# Patient Record
Sex: FEMALE | Race: WHITE | Employment: OTHER | URBAN - NONMETROPOLITAN AREA
[De-identification: names, ages, dates, MRNs, and addresses within clinical notes are randomized per-mention and may not be internally consistent; named-entity substitution may affect disease eponyms.]

---

## 2017-06-29 RX ORDER — FUROSEMIDE 20 MG/1
20 TABLET ORAL DAILY
Qty: 90 TABLET | Refills: 1 | Status: SHIPPED | OUTPATIENT
Start: 2017-06-29 | End: 2017-08-01 | Stop reason: SDUPTHER

## 2017-06-29 RX ORDER — AMLODIPINE BESYLATE 5 MG/1
5 TABLET ORAL 2 TIMES DAILY
Qty: 180 TABLET | Refills: 1 | Status: SHIPPED | OUTPATIENT
Start: 2017-06-29 | End: 2017-08-01 | Stop reason: SDUPTHER

## 2017-07-05 DIAGNOSIS — N18.9 CKD (CHRONIC KIDNEY DISEASE), UNSPECIFIED STAGE: ICD-10-CM

## 2017-07-05 DIAGNOSIS — I10 ESSENTIAL HYPERTENSION: Primary | ICD-10-CM

## 2017-07-11 ENCOUNTER — HOSPITAL ENCOUNTER (OUTPATIENT)
Dept: LAB | Age: 77
Discharge: HOME | End: 2017-07-11
Attending: INTERNAL MEDICINE | Admitting: INTERNAL MEDICINE

## 2017-07-11 LAB
ALBUMIN SERPL-MCNC: 4 GM/DL (ref 3.4–5)
ALP BLD-CCNC: 95 IU/L (ref 40–129)
ALT SERPL-CCNC: 19 U/L (ref 10–40)
ANION GAP SERPL CALCULATED.3IONS-SCNC: 12 MMOL/L (ref 4–16)
AST SERPL-CCNC: 23 IU/L (ref 15–37)
BASOPHILS ABSOLUTE: 0 K/CU MM
BASOPHILS RELATIVE PERCENT: 0.5 % (ref 0–1)
BILIRUB SERPL-MCNC: 0.4 MG/DL (ref 0–1)
BUN BLDV-MCNC: 28 MG/DL (ref 6–23)
CALCIUM SERPL-MCNC: 9.3 MG/DL (ref 8.3–10.6)
CHLORIDE BLD-SCNC: 106 MMOL/L (ref 99–110)
CO2: 24 MMOL/L (ref 21–32)
CREAT SERPL-MCNC: 1.4 MG/DL (ref 0.6–1.1)
DIFFERENTIAL TYPE: ABNORMAL
EOSINOPHILS ABSOLUTE: 0.1 K/CU MM
EOSINOPHILS RELATIVE PERCENT: 2 % (ref 0–3)
GFR AFRICAN AMERICAN: 44 ML/MIN/1.73M2
GFR NON-AFRICAN AMERICAN: 37 ML/MIN/1.73M2
GLUCOSE BLD-MCNC: 103 MG/DL (ref 70–140)
HCT VFR BLD CALC: 41 % (ref 37–47)
HEMOGLOBIN: 13.3 GM/DL (ref 12.5–16)
IMMATURE NEUTROPHIL %: 0.5 % (ref 0–0.43)
LYMPHOCYTES ABSOLUTE: 1 K/CU MM
LYMPHOCYTES RELATIVE PERCENT: 16.1 % (ref 24–44)
MCH RBC QN AUTO: 32.8 PG (ref 27–31)
MCHC RBC AUTO-ENTMCNC: 32.4 % (ref 32–36)
MCV RBC AUTO: 101 FL (ref 78–100)
MONOCYTES ABSOLUTE: 0.5 K/CU MM
MONOCYTES RELATIVE PERCENT: 7.9 % (ref 0–4)
PDW BLD-RTO: 13.6 % (ref 11.7–14.9)
PLATELET # BLD: 187 K/CU MM (ref 140–440)
PMV BLD AUTO: 9.8 FL (ref 7.5–11.1)
POTASSIUM SERPL-SCNC: 4.3 MMOL/L (ref 3.5–5.1)
RBC # BLD: 4.06 M/CU MM (ref 4.2–5.4)
SEGMENTED NEUTROPHILS ABSOLUTE COUNT: 4.5 K/CU MM
SEGMENTED NEUTROPHILS RELATIVE PERCENT: 73 % (ref 36–66)
SODIUM BLD-SCNC: 142 MMOL/L (ref 135–145)
TOTAL IMMATURE NEUTOROPHIL: 0.03 K/CU MM
TOTAL PROTEIN: 7.4 GM/DL (ref 6.4–8.2)
WBC # BLD: 6.1 K/CU MM (ref 4–10.5)

## 2017-08-01 ENCOUNTER — OFFICE VISIT (OUTPATIENT)
Dept: INTERNAL MEDICINE CLINIC | Age: 77
End: 2017-08-01

## 2017-08-01 VITALS
WEIGHT: 274 LBS | HEART RATE: 76 BPM | RESPIRATION RATE: 12 BRPM | DIASTOLIC BLOOD PRESSURE: 74 MMHG | TEMPERATURE: 98.3 F | HEIGHT: 64 IN | BODY MASS INDEX: 46.78 KG/M2 | SYSTOLIC BLOOD PRESSURE: 136 MMHG

## 2017-08-01 DIAGNOSIS — N18.9 CKD (CHRONIC KIDNEY DISEASE), UNSPECIFIED STAGE: ICD-10-CM

## 2017-08-01 DIAGNOSIS — I10 ESSENTIAL HYPERTENSION: Primary | ICD-10-CM

## 2017-08-01 DIAGNOSIS — H40.9 GLAUCOMA OF BOTH EYES, UNSPECIFIED GLAUCOMA: ICD-10-CM

## 2017-08-01 DIAGNOSIS — N20.0 KIDNEY STONES: ICD-10-CM

## 2017-08-01 DIAGNOSIS — F51.01 PRIMARY INSOMNIA: ICD-10-CM

## 2017-08-01 DIAGNOSIS — N39.41 URGE INCONTINENCE OF URINE: ICD-10-CM

## 2017-08-01 DIAGNOSIS — R60.0 PEDAL EDEMA: ICD-10-CM

## 2017-08-01 DIAGNOSIS — G57.91 NEUROPATHY OF RIGHT LOWER EXTREMITY: ICD-10-CM

## 2017-08-01 PROCEDURE — 0509F URINE INCON PLAN DOCD: CPT | Performed by: INTERNAL MEDICINE

## 2017-08-01 PROCEDURE — 1123F ACP DISCUSS/DSCN MKR DOCD: CPT | Performed by: INTERNAL MEDICINE

## 2017-08-01 PROCEDURE — G8427 DOCREV CUR MEDS BY ELIG CLIN: HCPCS | Performed by: INTERNAL MEDICINE

## 2017-08-01 PROCEDURE — G8417 CALC BMI ABV UP PARAM F/U: HCPCS | Performed by: INTERNAL MEDICINE

## 2017-08-01 PROCEDURE — 1036F TOBACCO NON-USER: CPT | Performed by: INTERNAL MEDICINE

## 2017-08-01 PROCEDURE — 4040F PNEUMOC VAC/ADMIN/RCVD: CPT | Performed by: INTERNAL MEDICINE

## 2017-08-01 PROCEDURE — 3017F COLORECTAL CA SCREEN DOC REV: CPT | Performed by: INTERNAL MEDICINE

## 2017-08-01 PROCEDURE — 99214 OFFICE O/P EST MOD 30 MIN: CPT | Performed by: INTERNAL MEDICINE

## 2017-08-01 PROCEDURE — 1090F PRES/ABSN URINE INCON ASSESS: CPT | Performed by: INTERNAL MEDICINE

## 2017-08-01 PROCEDURE — G8399 PT W/DXA RESULTS DOCUMENT: HCPCS | Performed by: INTERNAL MEDICINE

## 2017-08-01 RX ORDER — AMLODIPINE BESYLATE 5 MG/1
5 TABLET ORAL DAILY
Qty: 90 TABLET | Refills: 1 | Status: SHIPPED | OUTPATIENT
Start: 2017-08-01 | End: 2017-09-14 | Stop reason: SDUPTHER

## 2017-08-01 RX ORDER — QUETIAPINE FUMARATE 50 MG/1
50 TABLET, FILM COATED ORAL NIGHTLY
Qty: 90 TABLET | Refills: 1 | Status: SHIPPED | OUTPATIENT
Start: 2017-08-01 | End: 2017-11-28 | Stop reason: SDUPTHER

## 2017-08-01 RX ORDER — POTASSIUM CITRATE 10 MEQ/1
10 TABLET, EXTENDED RELEASE ORAL 2 TIMES DAILY
Qty: 180 TABLET | Refills: 1 | Status: SHIPPED | OUTPATIENT
Start: 2017-08-01 | End: 2017-09-14 | Stop reason: SDUPTHER

## 2017-08-01 RX ORDER — GABAPENTIN 300 MG/1
300 CAPSULE ORAL 3 TIMES DAILY
Qty: 270 CAPSULE | Refills: 1 | Status: SHIPPED | OUTPATIENT
Start: 2017-08-01 | End: 2017-09-14 | Stop reason: SDUPTHER

## 2017-08-01 RX ORDER — FUROSEMIDE 20 MG/1
20 TABLET ORAL DAILY
Qty: 90 TABLET | Refills: 1 | Status: SHIPPED | OUTPATIENT
Start: 2017-08-01 | End: 2017-09-14 | Stop reason: SDUPTHER

## 2017-08-01 RX ORDER — CARVEDILOL 25 MG/1
25 TABLET ORAL 2 TIMES DAILY WITH MEALS
Qty: 180 TABLET | Refills: 1 | Status: SHIPPED | OUTPATIENT
Start: 2017-08-01 | End: 2017-09-14 | Stop reason: SDUPTHER

## 2017-08-01 RX ORDER — QUETIAPINE FUMARATE 100 MG/1
50 TABLET, FILM COATED ORAL NIGHTLY
Qty: 45 TABLET | Refills: 1 | Status: CANCELLED | OUTPATIENT
Start: 2017-08-01

## 2017-08-01 ASSESSMENT — ENCOUNTER SYMPTOMS
GASTROINTESTINAL NEGATIVE: 1
EYES NEGATIVE: 1
RESPIRATORY NEGATIVE: 1

## 2017-08-22 ENCOUNTER — HOSPITAL ENCOUNTER (OUTPATIENT)
Dept: PHYSICAL THERAPY | Age: 77
Discharge: HOME | End: 2017-08-31
Attending: INTERNAL MEDICINE | Admitting: INTERNAL MEDICINE

## 2017-09-01 ENCOUNTER — HOSPITAL ENCOUNTER (OUTPATIENT)
Dept: PHYSICAL THERAPY | Age: 77
Discharge: HOME | End: 2017-09-30
Attending: INTERNAL MEDICINE | Admitting: INTERNAL MEDICINE

## 2017-09-14 ENCOUNTER — OFFICE VISIT (OUTPATIENT)
Dept: INTERNAL MEDICINE CLINIC | Age: 77
End: 2017-09-14

## 2017-09-14 VITALS
DIASTOLIC BLOOD PRESSURE: 86 MMHG | RESPIRATION RATE: 16 BRPM | BODY MASS INDEX: 46.28 KG/M2 | HEART RATE: 84 BPM | SYSTOLIC BLOOD PRESSURE: 134 MMHG | OXYGEN SATURATION: 96 % | WEIGHT: 269.6 LBS | TEMPERATURE: 97.6 F

## 2017-09-14 DIAGNOSIS — E66.01 MORBID OBESITY WITH BMI OF 45.0-49.9, ADULT (HCC): ICD-10-CM

## 2017-09-14 DIAGNOSIS — H04.123 DRY EYE SYNDROME, BILATERAL: ICD-10-CM

## 2017-09-14 DIAGNOSIS — R60.0 PEDAL EDEMA: ICD-10-CM

## 2017-09-14 DIAGNOSIS — I10 ESSENTIAL HYPERTENSION: Primary | ICD-10-CM

## 2017-09-14 DIAGNOSIS — N20.0 KIDNEY STONES: ICD-10-CM

## 2017-09-14 DIAGNOSIS — F51.01 PRIMARY INSOMNIA: ICD-10-CM

## 2017-09-14 DIAGNOSIS — N18.3 CKD (CHRONIC KIDNEY DISEASE), STAGE 3 (MODERATE): ICD-10-CM

## 2017-09-14 DIAGNOSIS — G57.91 NEUROPATHY OF RIGHT LOWER EXTREMITY: ICD-10-CM

## 2017-09-14 DIAGNOSIS — Z23 NEED FOR IMMUNIZATION AGAINST INFLUENZA: ICD-10-CM

## 2017-09-14 PROCEDURE — 3017F COLORECTAL CA SCREEN DOC REV: CPT | Performed by: INTERNAL MEDICINE

## 2017-09-14 PROCEDURE — 1090F PRES/ABSN URINE INCON ASSESS: CPT | Performed by: INTERNAL MEDICINE

## 2017-09-14 PROCEDURE — G8427 DOCREV CUR MEDS BY ELIG CLIN: HCPCS | Performed by: INTERNAL MEDICINE

## 2017-09-14 PROCEDURE — G0008 ADMIN INFLUENZA VIRUS VAC: HCPCS | Performed by: INTERNAL MEDICINE

## 2017-09-14 PROCEDURE — G8417 CALC BMI ABV UP PARAM F/U: HCPCS | Performed by: INTERNAL MEDICINE

## 2017-09-14 PROCEDURE — 90662 IIV NO PRSV INCREASED AG IM: CPT | Performed by: INTERNAL MEDICINE

## 2017-09-14 PROCEDURE — 1036F TOBACCO NON-USER: CPT | Performed by: INTERNAL MEDICINE

## 2017-09-14 PROCEDURE — 4040F PNEUMOC VAC/ADMIN/RCVD: CPT | Performed by: INTERNAL MEDICINE

## 2017-09-14 PROCEDURE — G8399 PT W/DXA RESULTS DOCUMENT: HCPCS | Performed by: INTERNAL MEDICINE

## 2017-09-14 PROCEDURE — 99213 OFFICE O/P EST LOW 20 MIN: CPT | Performed by: INTERNAL MEDICINE

## 2017-09-14 PROCEDURE — 1123F ACP DISCUSS/DSCN MKR DOCD: CPT | Performed by: INTERNAL MEDICINE

## 2017-09-14 RX ORDER — FUROSEMIDE 20 MG/1
20 TABLET ORAL DAILY
Qty: 90 TABLET | Refills: 1 | Status: SHIPPED | OUTPATIENT
Start: 2017-09-14 | End: 2017-11-28 | Stop reason: SDUPTHER

## 2017-09-14 RX ORDER — POTASSIUM CITRATE 10 MEQ/1
10 TABLET, EXTENDED RELEASE ORAL 2 TIMES DAILY
Qty: 180 TABLET | Refills: 1 | Status: SHIPPED | OUTPATIENT
Start: 2017-09-14 | End: 2017-11-28 | Stop reason: SDUPTHER

## 2017-09-14 RX ORDER — QUETIAPINE FUMARATE 50 MG/1
TABLET, FILM COATED ORAL
Qty: 90 TABLET | Refills: 1 | Status: SHIPPED | OUTPATIENT
Start: 2017-09-14 | End: 2017-11-28 | Stop reason: DRUGHIGH

## 2017-09-14 RX ORDER — CARVEDILOL 25 MG/1
25 TABLET ORAL 2 TIMES DAILY WITH MEALS
Qty: 180 TABLET | Refills: 1 | Status: SHIPPED | OUTPATIENT
Start: 2017-09-14 | End: 2017-11-28 | Stop reason: SDUPTHER

## 2017-09-14 RX ORDER — AMLODIPINE BESYLATE 5 MG/1
5 TABLET ORAL DAILY
Qty: 90 TABLET | Refills: 1 | Status: SHIPPED | OUTPATIENT
Start: 2017-09-14 | End: 2017-11-28 | Stop reason: SDUPTHER

## 2017-09-14 RX ORDER — GABAPENTIN 300 MG/1
300 CAPSULE ORAL 3 TIMES DAILY
Qty: 270 CAPSULE | Refills: 1 | Status: SHIPPED | OUTPATIENT
Start: 2017-09-14 | End: 2017-11-28 | Stop reason: SDUPTHER

## 2017-09-14 ASSESSMENT — PATIENT HEALTH QUESTIONNAIRE - PHQ9
2. FEELING DOWN, DEPRESSED OR HOPELESS: 0
SUM OF ALL RESPONSES TO PHQ QUESTIONS 1-9: 0
SUM OF ALL RESPONSES TO PHQ9 QUESTIONS 1 & 2: 0
1. LITTLE INTEREST OR PLEASURE IN DOING THINGS: 0

## 2017-09-17 PROBLEM — E66.01 MORBID OBESITY WITH BMI OF 45.0-49.9, ADULT (HCC): Status: ACTIVE | Noted: 2017-09-17

## 2017-09-17 ASSESSMENT — ENCOUNTER SYMPTOMS
VOMITING: 0
HEARTBURN: 0
GASTROINTESTINAL NEGATIVE: 1
EYES NEGATIVE: 1
NAUSEA: 0

## 2017-09-21 ENCOUNTER — HOSPITAL ENCOUNTER (OUTPATIENT)
Dept: PHYSICAL THERAPY | Age: 77
Discharge: HOME | End: 2017-09-21
Admitting: INTERNAL MEDICINE

## 2017-10-01 ENCOUNTER — HOSPITAL ENCOUNTER (OUTPATIENT)
Dept: PHYSICAL THERAPY | Age: 77
Discharge: HOME | End: 2017-10-31
Attending: INTERNAL MEDICINE | Admitting: INTERNAL MEDICINE

## 2017-11-21 ENCOUNTER — HOSPITAL ENCOUNTER (OUTPATIENT)
Dept: LAB | Age: 77
Discharge: HOME | End: 2017-11-21
Attending: INTERNAL MEDICINE | Admitting: INTERNAL MEDICINE

## 2017-11-21 LAB
ALBUMIN SERPL-MCNC: 4.2 GM/DL (ref 3.4–5)
ALP BLD-CCNC: 88 IU/L (ref 40–129)
ALT SERPL-CCNC: 16 U/L (ref 10–40)
ANION GAP SERPL CALCULATED.3IONS-SCNC: 18 MMOL/L (ref 4–16)
AST SERPL-CCNC: 24 IU/L (ref 15–37)
BILIRUB SERPL-MCNC: 0.4 MG/DL (ref 0–1)
BUN BLDV-MCNC: 25 MG/DL (ref 6–23)
CALCIUM SERPL-MCNC: 9.6 MG/DL (ref 8.3–10.6)
CHLORIDE BLD-SCNC: 105 MMOL/L (ref 99–110)
CO2: 22 MMOL/L (ref 21–32)
CREAT SERPL-MCNC: 1.3 MG/DL (ref 0.6–1.1)
GFR AFRICAN AMERICAN: 48 ML/MIN/1.73M2
GFR NON-AFRICAN AMERICAN: 40 ML/MIN/1.73M2
GLUCOSE BLD-MCNC: 93 MG/DL (ref 70–99)
POTASSIUM SERPL-SCNC: 4.6 MMOL/L (ref 3.5–5.1)
SODIUM BLD-SCNC: 145 MMOL/L (ref 135–145)
TOTAL PROTEIN: 7.5 GM/DL (ref 6.4–8.2)

## 2017-11-28 ENCOUNTER — OFFICE VISIT (OUTPATIENT)
Dept: INTERNAL MEDICINE CLINIC | Age: 77
End: 2017-11-28

## 2017-11-28 VITALS
DIASTOLIC BLOOD PRESSURE: 74 MMHG | OXYGEN SATURATION: 95 % | HEART RATE: 64 BPM | BODY MASS INDEX: 46.23 KG/M2 | WEIGHT: 270.8 LBS | SYSTOLIC BLOOD PRESSURE: 132 MMHG | HEIGHT: 64 IN | RESPIRATION RATE: 12 BRPM | TEMPERATURE: 97.9 F

## 2017-11-28 DIAGNOSIS — H04.123 INSUFFICIENCY OF TEAR FILM OF BOTH EYES: ICD-10-CM

## 2017-11-28 DIAGNOSIS — F51.01 PRIMARY INSOMNIA: ICD-10-CM

## 2017-11-28 DIAGNOSIS — N39.41 URGE INCONTINENCE OF URINE: ICD-10-CM

## 2017-11-28 DIAGNOSIS — N20.0 KIDNEY STONES: ICD-10-CM

## 2017-11-28 DIAGNOSIS — N18.30 STAGE 3 CHRONIC KIDNEY DISEASE (HCC): ICD-10-CM

## 2017-11-28 DIAGNOSIS — H40.9 GLAUCOMA OF BOTH EYES, UNSPECIFIED GLAUCOMA TYPE: ICD-10-CM

## 2017-11-28 DIAGNOSIS — E66.01 MORBID OBESITY WITH BMI OF 45.0-49.9, ADULT (HCC): ICD-10-CM

## 2017-11-28 DIAGNOSIS — I10 ESSENTIAL HYPERTENSION: ICD-10-CM

## 2017-11-28 DIAGNOSIS — Z12.39 BREAST CANCER SCREENING: Primary | ICD-10-CM

## 2017-11-28 DIAGNOSIS — G57.91 NEUROPATHY OF RIGHT LOWER EXTREMITY: ICD-10-CM

## 2017-11-28 DIAGNOSIS — R60.0 PEDAL EDEMA: ICD-10-CM

## 2017-11-28 PROCEDURE — 99214 OFFICE O/P EST MOD 30 MIN: CPT | Performed by: INTERNAL MEDICINE

## 2017-11-28 PROCEDURE — G8484 FLU IMMUNIZE NO ADMIN: HCPCS | Performed by: INTERNAL MEDICINE

## 2017-11-28 PROCEDURE — 1123F ACP DISCUSS/DSCN MKR DOCD: CPT | Performed by: INTERNAL MEDICINE

## 2017-11-28 PROCEDURE — 1036F TOBACCO NON-USER: CPT | Performed by: INTERNAL MEDICINE

## 2017-11-28 PROCEDURE — G8399 PT W/DXA RESULTS DOCUMENT: HCPCS | Performed by: INTERNAL MEDICINE

## 2017-11-28 PROCEDURE — 0509F URINE INCON PLAN DOCD: CPT | Performed by: INTERNAL MEDICINE

## 2017-11-28 PROCEDURE — G8417 CALC BMI ABV UP PARAM F/U: HCPCS | Performed by: INTERNAL MEDICINE

## 2017-11-28 PROCEDURE — G8427 DOCREV CUR MEDS BY ELIG CLIN: HCPCS | Performed by: INTERNAL MEDICINE

## 2017-11-28 PROCEDURE — 1090F PRES/ABSN URINE INCON ASSESS: CPT | Performed by: INTERNAL MEDICINE

## 2017-11-28 PROCEDURE — 4040F PNEUMOC VAC/ADMIN/RCVD: CPT | Performed by: INTERNAL MEDICINE

## 2017-11-28 RX ORDER — AMLODIPINE BESYLATE 5 MG/1
5 TABLET ORAL DAILY
Qty: 90 TABLET | Refills: 1 | Status: SHIPPED | OUTPATIENT
Start: 2017-11-28 | End: 2021-04-21 | Stop reason: ALTCHOICE

## 2017-11-28 RX ORDER — POTASSIUM CITRATE 10 MEQ/1
10 TABLET, EXTENDED RELEASE ORAL 2 TIMES DAILY
Qty: 180 TABLET | Refills: 1 | Status: SHIPPED | OUTPATIENT
Start: 2017-11-28 | End: 2020-10-12 | Stop reason: ALTCHOICE

## 2017-11-28 RX ORDER — GABAPENTIN 300 MG/1
300 CAPSULE ORAL 3 TIMES DAILY
Qty: 270 CAPSULE | Refills: 1 | Status: SHIPPED | OUTPATIENT
Start: 2017-11-28

## 2017-11-28 RX ORDER — CARVEDILOL 25 MG/1
25 TABLET ORAL 2 TIMES DAILY WITH MEALS
Qty: 180 TABLET | Refills: 1 | Status: ON HOLD | OUTPATIENT
Start: 2017-11-28 | End: 2021-03-23 | Stop reason: HOSPADM

## 2017-11-28 RX ORDER — ACYCLOVIR 400 MG/1
400 TABLET ORAL 2 TIMES DAILY
Qty: 180 TABLET | Refills: 1 | Status: CANCELLED | OUTPATIENT
Start: 2017-11-28

## 2017-11-28 RX ORDER — QUETIAPINE FUMARATE 50 MG/1
25 TABLET, FILM COATED ORAL NIGHTLY
Qty: 90 TABLET | Refills: 1 | Status: SHIPPED | OUTPATIENT
Start: 2017-11-28

## 2017-11-28 RX ORDER — FUROSEMIDE 20 MG/1
20 TABLET ORAL DAILY
Qty: 90 TABLET | Refills: 1 | Status: ON HOLD | OUTPATIENT
Start: 2017-11-28 | End: 2021-03-23 | Stop reason: HOSPADM

## 2017-11-28 ASSESSMENT — ENCOUNTER SYMPTOMS
RESPIRATORY NEGATIVE: 1
GASTROINTESTINAL NEGATIVE: 1
EYES NEGATIVE: 1

## 2017-11-28 NOTE — ASSESSMENT & PLAN NOTE
Has chronic pedal edema takes Lasix for that  Could be lymphedema. Keep leg elevated and use compression stockings.

## 2017-11-28 NOTE — PROGRESS NOTES
Daniel Johnson  Patient's  is 1940  Seen in office on 2017      SUBJECTIVE:  Roosevelt Munoz is a 69 y. o.year old female presents today   Chief Complaint   Patient presents with    Hypertension    Medication Refill    Discuss Labs     17     Pt is here for f/u of HTN neuropathy and CKD  She is feeling well. No complaints  No chest pain. No SOB  No headache. No NVD>  Lab results reviewed with you. Patient has hypertension. Taking medications No headaches, no chest pain, no palpitations and no dizziness. Has pain in the right knee. She had knee replacement   She has dry eye syndrome and takes med  She is using wheeled walker. Denies any falls. Taking medications regularly. No side effects noted. Review of Systems   Constitutional: Negative. HENT: Negative. Eyes: Negative. Respiratory: Negative. Cardiovascular: Negative. Negative for chest pain, palpitations, claudication and leg swelling. Gastrointestinal: Negative. Genitourinary: Negative. Musculoskeletal: Positive for joint pain. Negative for falls. Skin: Negative. Neurological: Negative. Endo/Heme/Allergies: Negative. Psychiatric/Behavioral: Negative. OBJECTIVE: /74 (Site: Left Arm, Position: Sitting)   Pulse 64   Temp 97.9 °F (36.6 °C)   Resp 12   Ht 5' 4\" (1.626 m)   Wt 270 lb 12.8 oz (122.8 kg)   SpO2 95%   BMI 46.48 kg/m²     Wt Readings from Last 3 Encounters:   17 270 lb 12.8 oz (122.8 kg)   17 269 lb 9.6 oz (122.3 kg)   17 274 lb (124.3 kg)      GENERAL:  Alert, oriented, pleasant, in no apparent distress. Obese. HEENT:  Conjunctiva pink, no scleral icterus. ENT clear. NECK:  Supple. No jugular venous distention noted. No masses felt,  CARDIOVASCULAR:  Normal S1 and S2  MARIBELL 2/6  PULMONARY:  No respiratory distress. No wheezes or rales. ABDOMEN:  Soft and non-tender,no masses  or organomegaly.   EXTREMITIES:  No cyanosis, clubbing, or significant 07/11/2017    HCT 41.0 07/11/2017     07/11/2017     Lipids:   Lab Results   Component Value Date    CHOL 194 11/10/2015    TRIG 96 11/10/2015    HDL 60 11/10/2015    LDLCALC 122 (H) 04/26/2012    LDLDIRECT 113 (H) 11/10/2015     Renal:   Lab Results   Component Value Date    BUN 25 11/21/2017    CREATININE 1.3 11/21/2017     11/21/2017    K 4.6 11/21/2017    ALT 16 11/21/2017    AST 24 11/21/2017    GLUCOSE 93 11/21/2017     PT/INR:   Lab Results   Component Value Date    INR 0.94 11/15/2013     A1C: No results found for: Ania Monroy MD, 11/28/2017 , 3:14 PM

## 2017-11-28 NOTE — ASSESSMENT & PLAN NOTE
Seen urologist in [place] Dr. Shasta Youngblood. 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 She  had ? urostomy and then had stent and  had lithotripsy.  Sees him every year  Pt is on Urocit-K

## 2017-11-28 NOTE — ASSESSMENT & PLAN NOTE
Has pain in the right leg and tingling since right hip surgery several years ago.   Patient is on gabapentin 300 mg 3 times a day

## 2018-06-04 ENCOUNTER — TELEPHONE (OUTPATIENT)
Dept: INTERNAL MEDICINE CLINIC | Age: 78
End: 2018-06-04

## 2018-06-26 ENCOUNTER — HOSPITAL ENCOUNTER (OUTPATIENT)
Dept: ULTRASOUND IMAGING | Age: 78
Discharge: HOME | End: 2018-06-26

## 2018-06-26 DIAGNOSIS — N20.0 CALCULUS, KIDNEY: ICD-10-CM

## 2018-06-26 DIAGNOSIS — I10 HYPERTENSION, UNSPECIFIED TYPE: ICD-10-CM

## 2018-06-26 DIAGNOSIS — N18.4 CHRONIC KIDNEY DISEASE, STAGE IV (SEVERE) (HCC): ICD-10-CM

## 2018-06-26 LAB
ALBUMIN SERPL-MCNC: 4.4 GM/DL (ref 3.4–5)
ANION GAP SERPL CALCULATED.3IONS-SCNC: 17 MMOL/L (ref 4–16)
BACTERIA: ABNORMAL /HPF
BILIRUBIN URINE: NEGATIVE MG/DL
BLOOD, URINE: NEGATIVE
BUN BLDV-MCNC: 24 MG/DL (ref 6–23)
CALCIUM SERPL-MCNC: 9.2 MG/DL (ref 8.3–10.6)
CHLORIDE BLD-SCNC: 103 MMOL/L (ref 99–110)
CLARITY: ABNORMAL
CO2: 22 MMOL/L (ref 21–32)
COLOR: YELLOW
CREAT SERPL-MCNC: 1.5 MG/DL (ref 0.6–1.1)
CREATININE URINE: 81.1 MG/DL (ref 28–217)
GFR AFRICAN AMERICAN: 41 ML/MIN/1.73M2
GFR NON-AFRICAN AMERICAN: 34 ML/MIN/1.73M2
GLUCOSE BLD-MCNC: 81 MG/DL (ref 70–99)
GLUCOSE, URINE: NEGATIVE MG/DL
KETONES, URINE: NEGATIVE MG/DL
LEUKOCYTE ESTERASE, URINE: ABNORMAL
MUCUS: ABNORMAL HPF
NITRITE URINE, QUANTITATIVE: NEGATIVE
PH, URINE: 5 (ref 5–8)
PHOSPHORUS: 3.8 MG/DL (ref 2.5–4.9)
POTASSIUM SERPL-SCNC: 4.7 MMOL/L (ref 3.5–5.1)
PROT/CREAT RATIO, UR: 0.1
PROTEIN UA: NEGATIVE MG/DL
RBC URINE: 2 /HPF (ref 0–6)
SODIUM BLD-SCNC: 142 MMOL/L (ref 135–145)
SPECIFIC GRAVITY UA: 1.01 (ref 1–1.03)
SQUAMOUS EPITHELIAL: <1 /HPF
TRICHOMONAS: ABNORMAL /HPF
URINE TOTAL PROTEIN: 9.7 MG/DL
UROBILINOGEN, URINE: NORMAL MG/DL (ref 0.2–1)
VITAMIN D 25-HYDROXY: 47.61 NG/ML
WBC UA: 1 /HPF (ref 0–5)

## 2018-06-28 LAB — PARATHYROID HORMONE INTACT: 91

## 2018-07-18 ENCOUNTER — HOSPITAL ENCOUNTER (OUTPATIENT)
Dept: LAB | Age: 78
Discharge: HOME | End: 2018-07-18
Attending: INTERNAL MEDICINE | Admitting: INTERNAL MEDICINE

## 2018-07-18 LAB
ALBUMIN SERPL-MCNC: 4.1 GM/DL (ref 3.4–5)
ANION GAP SERPL CALCULATED.3IONS-SCNC: 15 MMOL/L (ref 4–16)
BUN BLDV-MCNC: 19 MG/DL (ref 6–23)
CALCIUM SERPL-MCNC: 9.5 MG/DL (ref 8.3–10.6)
CHLORIDE BLD-SCNC: 106 MMOL/L (ref 99–110)
CO2: 25 MMOL/L (ref 21–32)
CREAT SERPL-MCNC: 1.3 MG/DL (ref 0.6–1.1)
GFR AFRICAN AMERICAN: 48 ML/MIN/1.73M2
GFR NON-AFRICAN AMERICAN: 40 ML/MIN/1.73M2
GLUCOSE BLD-MCNC: 89 MG/DL (ref 70–99)
PHOSPHORUS: 3.7 MG/DL (ref 2.5–4.9)
POTASSIUM SERPL-SCNC: 4.2 MMOL/L (ref 3.5–5.1)
SODIUM BLD-SCNC: 146 MMOL/L (ref 135–145)

## 2018-10-09 ENCOUNTER — HOSPITAL ENCOUNTER (OUTPATIENT)
Age: 78
Discharge: HOME | End: 2018-10-09
Payer: MEDICARE

## 2018-10-09 LAB
ALBUMIN SERPL-MCNC: 3.8 GM/DL (ref 3.4–5)
ANION GAP SERPL CALCULATED.3IONS-SCNC: 12 MMOL/L (ref 4–16)
BACTERIA: ABNORMAL /HPF
BILIRUBIN URINE: NEGATIVE MG/DL
BLOOD, URINE: NEGATIVE
BUN BLDV-MCNC: 19 MG/DL (ref 6–23)
CALCIUM SERPL-MCNC: 9 MG/DL (ref 8.3–10.6)
CAST TYPE: ABNORMAL /HPF
CHLORIDE BLD-SCNC: 107 MMOL/L (ref 99–110)
CLARITY: CLEAR
CO2: 28 MMOL/L (ref 21–32)
COLOR: YELLOW
CREAT SERPL-MCNC: 1.3 MG/DL (ref 0.6–1.1)
CREATININE URINE: 85.8 MG/DL (ref 28–217)
CRYSTAL TYPE: ABNORMAL /HPF
EPITHELIAL CELLS, UA: ABNORMAL /HPF
GFR AFRICAN AMERICAN: 48 ML/MIN/1.73M2
GFR NON-AFRICAN AMERICAN: 40 ML/MIN/1.73M2
GLUCOSE BLD-MCNC: 98 MG/DL (ref 70–99)
GLUCOSE, URINE: NEGATIVE MG/DL
KETONES, URINE: NEGATIVE MG/DL
LEUKOCYTE ESTERASE, URINE: NEGATIVE
MUCUS: NEGATIVE HPF
NITRITE URINE, QUANTITATIVE: NEGATIVE
PH, URINE: 5 (ref 5–8)
PHOSPHORUS: 3.9 MG/DL (ref 2.5–4.9)
POTASSIUM SERPL-SCNC: 4.3 MMOL/L (ref 3.5–5.1)
PROT/CREAT RATIO, UR: 0.1
PROTEIN UA: NEGATIVE MG/DL
RBC URINE: ABNORMAL /HPF (ref 0–6)
SODIUM BLD-SCNC: 147 MMOL/L (ref 135–145)
SPECIFIC GRAVITY UA: 1.01 (ref 1–1.03)
URINE TOTAL PROTEIN: 6.9 MG/DL
UROBILINOGEN, URINE: 1 MG/DL (ref 0.2–1)
VITAMIN D 25-HYDROXY: 42.13 NG/ML
VOLUME, (UVOL): 12 ML (ref 10–12)
WBC UA: ABNORMAL /HPF (ref 0–5)

## 2018-10-09 PROCEDURE — 84156 ASSAY OF PROTEIN URINE: CPT

## 2018-10-09 PROCEDURE — 82306 VITAMIN D 25 HYDROXY: CPT

## 2018-10-09 PROCEDURE — 80069 RENAL FUNCTION PANEL: CPT

## 2018-10-09 PROCEDURE — 83970 ASSAY OF PARATHORMONE: CPT

## 2018-10-09 PROCEDURE — 81001 URINALYSIS AUTO W/SCOPE: CPT

## 2018-10-09 PROCEDURE — 82570 ASSAY OF URINE CREATININE: CPT

## 2018-10-09 PROCEDURE — 36415 COLL VENOUS BLD VENIPUNCTURE: CPT

## 2018-10-12 LAB — PARATHYROID HORMONE INTACT: 119

## 2019-05-31 ENCOUNTER — HOSPITAL ENCOUNTER (OUTPATIENT)
Age: 79
Discharge: HOME | End: 2019-05-31
Payer: MEDICARE

## 2019-05-31 LAB
ALBUMIN SERPL-MCNC: 4 GM/DL (ref 3.4–5)
ANION GAP SERPL CALCULATED.3IONS-SCNC: 8 MMOL/L (ref 4–16)
BASOPHILS ABSOLUTE: 0 K/CU MM
BASOPHILS RELATIVE PERCENT: 0.7 % (ref 0–1)
BUN BLDV-MCNC: 17 MG/DL (ref 6–23)
CALCIUM SERPL-MCNC: 10.1 MG/DL (ref 8.3–10.6)
CHLORIDE BLD-SCNC: 104 MMOL/L (ref 99–110)
CO2: 30 MMOL/L (ref 21–32)
CREAT SERPL-MCNC: 1.2 MG/DL (ref 0.6–1.1)
DIFFERENTIAL TYPE: ABNORMAL
EOSINOPHILS ABSOLUTE: 0.2 K/CU MM
EOSINOPHILS RELATIVE PERCENT: 3.4 % (ref 0–3)
GFR AFRICAN AMERICAN: 53 ML/MIN/1.73M2
GFR NON-AFRICAN AMERICAN: 44 ML/MIN/1.73M2
GLUCOSE BLD-MCNC: 102 MG/DL (ref 70–99)
HCT VFR BLD CALC: 46.1 % (ref 37–47)
HEMOGLOBIN: 14.8 GM/DL (ref 12.5–16)
IMMATURE NEUTROPHIL %: 0.5 % (ref 0–0.43)
LYMPHOCYTES ABSOLUTE: 1 K/CU MM
LYMPHOCYTES RELATIVE PERCENT: 18.9 % (ref 24–44)
MCH RBC QN AUTO: 32.4 PG (ref 27–31)
MCHC RBC AUTO-ENTMCNC: 32.1 % (ref 32–36)
MCV RBC AUTO: 100.9 FL (ref 78–100)
MONOCYTES ABSOLUTE: 0.5 K/CU MM
MONOCYTES RELATIVE PERCENT: 9.4 % (ref 0–4)
PDW BLD-RTO: 13.3 % (ref 11.7–14.9)
PHOSPHORUS: 3.2 MG/DL (ref 2.5–4.9)
PLATELET # BLD: 196 K/CU MM (ref 140–440)
PMV BLD AUTO: 9.7 FL (ref 7.5–11.1)
POTASSIUM SERPL-SCNC: 4.3 MMOL/L (ref 3.5–5.1)
RBC # BLD: 4.57 M/CU MM (ref 4.2–5.4)
SEGMENTED NEUTROPHILS ABSOLUTE COUNT: 3.7 K/CU MM
SEGMENTED NEUTROPHILS RELATIVE PERCENT: 67.1 % (ref 36–66)
SODIUM BLD-SCNC: 142 MMOL/L (ref 135–145)
TOTAL IMMATURE NEUTOROPHIL: 0.03 K/CU MM
WBC # BLD: 5.5 K/CU MM (ref 4–10.5)

## 2019-05-31 PROCEDURE — 80069 RENAL FUNCTION PANEL: CPT

## 2019-05-31 PROCEDURE — 85025 COMPLETE CBC W/AUTO DIFF WBC: CPT

## 2019-05-31 PROCEDURE — 36415 COLL VENOUS BLD VENIPUNCTURE: CPT

## 2019-08-27 ENCOUNTER — HOSPITAL ENCOUNTER (OUTPATIENT)
Age: 79
Discharge: HOME | End: 2019-08-27
Payer: MEDICARE

## 2019-08-27 LAB
ALBUMIN SERPL-MCNC: 4.2 GM/DL (ref 3.4–5)
ALP BLD-CCNC: 84 IU/L (ref 40–129)
ALT SERPL-CCNC: 14 U/L (ref 10–40)
ANION GAP SERPL CALCULATED.3IONS-SCNC: 11 MMOL/L (ref 4–16)
AST SERPL-CCNC: 19 IU/L (ref 15–37)
BACTERIA: NEGATIVE /HPF
BILIRUB SERPL-MCNC: 0.9 MG/DL (ref 0–1)
BILIRUBIN URINE: NEGATIVE MG/DL
BLOOD, URINE: NEGATIVE
BUN BLDV-MCNC: 22 MG/DL (ref 6–23)
CALCIUM IONIZED: 4.48 MG/DL (ref 4.48–5.28)
CALCIUM SERPL-MCNC: 9.6 MG/DL (ref 8.3–10.6)
CAST TYPE: NORMAL /HPF
CHLORIDE BLD-SCNC: 104 MMOL/L (ref 99–110)
CHOLESTEROL, FASTING: 201 MG/DL
CLARITY: CLEAR
CO2: 30 MMOL/L (ref 21–32)
COLOR: YELLOW
CREAT SERPL-MCNC: 1.1 MG/DL (ref 0.6–1.1)
CREATININE URINE: 47.9 MG/DL (ref 28–217)
CRYSTAL TYPE: NORMAL /HPF
EPITHELIAL CELLS, UA: NORMAL /HPF
FOLATE: >20 NG/ML (ref 3.1–17.5)
GFR AFRICAN AMERICAN: 58 ML/MIN/1.73M2
GFR NON-AFRICAN AMERICAN: 48 ML/MIN/1.73M2
GLUCOSE FASTING: 107 MG/DL (ref 70–99)
GLUCOSE, URINE: NEGATIVE MG/DL
HDLC SERPL-MCNC: 61 MG/DL
IONIZED CA: 1.12 MMOL/L (ref 1.12–1.32)
KETONES, URINE: NEGATIVE MG/DL
LDL CHOLESTEROL DIRECT: 133 MG/DL
LEUKOCYTE ESTERASE, URINE: NEGATIVE
MUCUS: NEGATIVE HPF
NITRITE URINE, QUANTITATIVE: NEGATIVE
PH, URINE: 5 (ref 5–8)
POTASSIUM SERPL-SCNC: 3.8 MMOL/L (ref 3.5–5.1)
PROT/CREAT RATIO, UR: ABNORMAL
PROTEIN UA: NEGATIVE MG/DL
RBC URINE: NORMAL /HPF (ref 0–6)
SODIUM BLD-SCNC: 145 MMOL/L (ref 135–145)
SPECIFIC GRAVITY UA: 1.01 (ref 1–1.03)
TOTAL PROTEIN: 7.6 GM/DL (ref 6.4–8.2)
TRIGLYCERIDE, FASTING: 96 MG/DL
TSH HIGH SENSITIVITY: 2.58 UIU/ML (ref 0.27–4.2)
URINE TOTAL PROTEIN: 7.7 MG/DL
UROBILINOGEN, URINE: 0.2 MG/DL (ref 0.2–1)
VITAMIN B-12: 287.4 PG/ML (ref 211–911)
VOLUME, (UVOL): 12 ML (ref 10–12)
WBC UA: NORMAL /HPF (ref 0–5)

## 2019-08-27 PROCEDURE — 36415 COLL VENOUS BLD VENIPUNCTURE: CPT

## 2019-08-27 PROCEDURE — 80061 LIPID PANEL: CPT

## 2019-08-27 PROCEDURE — 84436 ASSAY OF TOTAL THYROXINE: CPT

## 2019-08-27 PROCEDURE — 84443 ASSAY THYROID STIM HORMONE: CPT

## 2019-08-27 PROCEDURE — 82746 ASSAY OF FOLIC ACID SERUM: CPT

## 2019-08-27 PROCEDURE — 80053 COMPREHEN METABOLIC PANEL: CPT

## 2019-08-27 PROCEDURE — 83970 ASSAY OF PARATHORMONE: CPT

## 2019-08-27 PROCEDURE — 81001 URINALYSIS AUTO W/SCOPE: CPT

## 2019-08-27 PROCEDURE — 82570 ASSAY OF URINE CREATININE: CPT

## 2019-08-27 PROCEDURE — 82330 ASSAY OF CALCIUM: CPT

## 2019-08-27 PROCEDURE — 82607 VITAMIN B-12: CPT

## 2019-08-27 PROCEDURE — 84156 ASSAY OF PROTEIN URINE: CPT

## 2019-08-28 LAB
PARATHYROID HORMONE INTACT: 173 PG/ML (ref 15–65)
PARATHYROID HORMONE INTACT: ABNORMAL PG/ML (ref 15–65)
T4 TOTAL: 5.98 UG/DL (ref 5.1–14.1)
T4 TOTAL: NORMAL UG/DL (ref 5.1–14.1)

## 2020-10-06 ENCOUNTER — HOSPITAL ENCOUNTER (OUTPATIENT)
Age: 80
Discharge: HOME | End: 2020-10-06
Payer: MEDICARE

## 2020-10-06 LAB
ALBUMIN SERPL-MCNC: 4.1 GM/DL (ref 3.4–5)
ALP BLD-CCNC: 65 IU/L (ref 40–129)
ALT SERPL-CCNC: 13 U/L (ref 10–40)
ANION GAP SERPL CALCULATED.3IONS-SCNC: 6 MMOL/L (ref 4–16)
AST SERPL-CCNC: 19 IU/L (ref 15–37)
BACTERIA: ABNORMAL /HPF
BILIRUB SERPL-MCNC: 0.6 MG/DL (ref 0–1)
BILIRUBIN URINE: ABNORMAL MG/DL
BLOOD, URINE: NEGATIVE
BUN BLDV-MCNC: 22 MG/DL (ref 6–23)
CALCIUM IONIZED: 4.72 MG/DL (ref 4.48–5.28)
CALCIUM SERPL-MCNC: 9 MG/DL (ref 8.3–10.6)
CAST TYPE: ABNORMAL /HPF
CHLORIDE BLD-SCNC: 106 MMOL/L (ref 99–110)
CHOLESTEROL, FASTING: 199 MG/DL
CLARITY: CLEAR
CO2: 31 MMOL/L (ref 21–32)
COLOR: YELLOW
CREAT SERPL-MCNC: 1.2 MG/DL (ref 0.6–1.1)
CREATININE URINE: 177.3 MG/DL (ref 28–217)
CRYSTAL TYPE: ABNORMAL /HPF
EPITHELIAL CELLS, UA: ABNORMAL /HPF
GFR AFRICAN AMERICAN: 53 ML/MIN/1.73M2
GFR NON-AFRICAN AMERICAN: 43 ML/MIN/1.73M2
GLUCOSE FASTING: 95 MG/DL (ref 70–99)
GLUCOSE, URINE: NEGATIVE MG/DL
HDLC SERPL-MCNC: 68 MG/DL
ICTOTEST: NEGATIVE
IONIZED CA: 1.18 MMOL/L (ref 1.12–1.32)
KETONES, URINE: NEGATIVE MG/DL
LDL CHOLESTEROL DIRECT: 124 MG/DL
LEUKOCYTE ESTERASE, URINE: NEGATIVE
MUCUS: NEGATIVE HPF
NITRITE URINE, QUANTITATIVE: NEGATIVE
PH, URINE: 5 (ref 5–8)
POTASSIUM SERPL-SCNC: 4.9 MMOL/L (ref 3.5–5.1)
PROT/CREAT RATIO, UR: 0.2
PROTEIN UA: ABNORMAL MG/DL
RBC URINE: ABNORMAL /HPF (ref 0–6)
SODIUM BLD-SCNC: 143 MMOL/L (ref 135–145)
SPECIFIC GRAVITY UA: 1.02 (ref 1–1.03)
TOTAL PROTEIN: 7.1 GM/DL (ref 6.4–8.2)
TRIGLYCERIDE, FASTING: 107 MG/DL
URINE TOTAL PROTEIN: 35.8 MG/DL
UROBILINOGEN, URINE: 0.2 MG/DL (ref 0.2–1)
VITAMIN D 25-HYDROXY: 34.3 NG/ML
VOLUME, (UVOL): 12 ML (ref 10–12)
WBC UA: ABNORMAL /HPF (ref 0–5)

## 2020-10-06 PROCEDURE — 80061 LIPID PANEL: CPT

## 2020-10-06 PROCEDURE — 80053 COMPREHEN METABOLIC PANEL: CPT

## 2020-10-06 PROCEDURE — 81001 URINALYSIS AUTO W/SCOPE: CPT

## 2020-10-06 PROCEDURE — 36415 COLL VENOUS BLD VENIPUNCTURE: CPT

## 2020-10-06 PROCEDURE — 84156 ASSAY OF PROTEIN URINE: CPT

## 2020-10-06 PROCEDURE — 82306 VITAMIN D 25 HYDROXY: CPT

## 2020-10-06 PROCEDURE — 82570 ASSAY OF URINE CREATININE: CPT

## 2020-10-06 PROCEDURE — 82330 ASSAY OF CALCIUM: CPT

## 2020-10-06 PROCEDURE — 83970 ASSAY OF PARATHORMONE: CPT

## 2020-10-08 LAB — PARATHYROID HORMONE INTACT: 236 PG/ML (ref 15–65)

## 2020-10-12 PROBLEM — N18.31 STAGE 3A CHRONIC KIDNEY DISEASE (HCC): Status: ACTIVE | Noted: 2020-10-12

## 2020-10-12 PROBLEM — N25.81 SECONDARY HYPERPARATHYROIDISM OF RENAL ORIGIN (HCC): Status: ACTIVE | Noted: 2020-10-12

## 2021-01-04 PROBLEM — I27.20 MODERATE TO SEVERE PULMONARY HYPERTENSION (HCC): Status: ACTIVE | Noted: 2021-01-04

## 2021-01-04 PROBLEM — J96.10 CHRONIC RESPIRATORY FAILURE (HCC): Status: ACTIVE | Noted: 2021-01-04

## 2021-01-06 ENCOUNTER — HOSPITAL ENCOUNTER (OUTPATIENT)
Age: 81
Discharge: HOME | End: 2021-01-06
Payer: MEDICARE

## 2021-01-06 LAB
ALBUMIN SERPL-MCNC: 4.4 GM/DL (ref 3.4–5)
ANION GAP SERPL CALCULATED.3IONS-SCNC: 13 MMOL/L (ref 4–16)
BASOPHILS ABSOLUTE: 0 K/CU MM
BASOPHILS RELATIVE PERCENT: 0.6 % (ref 0–1)
BUN BLDV-MCNC: 21 MG/DL (ref 6–23)
CALCIUM IONIZED: 1.2 MG/DL (ref 4.48–5.28)
CALCIUM SERPL-MCNC: 10.1 MG/DL (ref 8.3–10.6)
CHLORIDE BLD-SCNC: 105 MMOL/L (ref 99–110)
CHOLESTEROL, FASTING: 214 MG/DL
CO2: 27 MMOL/L (ref 21–32)
CREAT SERPL-MCNC: 1.5 MG/DL (ref 0.6–1.1)
DIFFERENTIAL TYPE: ABNORMAL
EOSINOPHILS ABSOLUTE: 0.2 K/CU MM
EOSINOPHILS RELATIVE PERCENT: 2.4 % (ref 0–3)
GFR AFRICAN AMERICAN: 41 ML/MIN/1.73M2
GFR NON-AFRICAN AMERICAN: 33 ML/MIN/1.73M2
GLUCOSE BLD-MCNC: 99 MG/DL (ref 70–99)
HCT VFR BLD CALC: 51.3 % (ref 37–47)
HDLC SERPL-MCNC: 80 MG/DL
HEMOGLOBIN: 16.4 GM/DL (ref 12.5–16)
IMMATURE NEUTROPHIL %: 0.2 % (ref 0–0.43)
IONIZED CA: 1.2 MMOL/L (ref 1.12–1.32)
LDL CHOLESTEROL DIRECT: 119 MG/DL
LYMPHOCYTES ABSOLUTE: 1 K/CU MM
LYMPHOCYTES RELATIVE PERCENT: 14.7 % (ref 24–44)
MCH RBC QN AUTO: 32.7 PG (ref 27–31)
MCHC RBC AUTO-ENTMCNC: 32 % (ref 32–36)
MCV RBC AUTO: 102.4 FL (ref 78–100)
MONOCYTES ABSOLUTE: 0.5 K/CU MM
MONOCYTES RELATIVE PERCENT: 7 % (ref 0–4)
PDW BLD-RTO: 14.5 % (ref 11.7–14.9)
PHOSPHORUS: 3.9 MG/DL (ref 2.5–4.9)
PLATELET # BLD: 188 K/CU MM (ref 140–440)
PMV BLD AUTO: 9.5 FL (ref 7.5–11.1)
POTASSIUM SERPL-SCNC: 4.7 MMOL/L (ref 3.5–5.1)
RBC # BLD: 5.01 M/CU MM (ref 4.2–5.4)
SEGMENTED NEUTROPHILS ABSOLUTE COUNT: 4.9 K/CU MM
SEGMENTED NEUTROPHILS RELATIVE PERCENT: 75.1 % (ref 36–66)
SODIUM BLD-SCNC: 145 MMOL/L (ref 135–145)
TOTAL IMMATURE NEUTOROPHIL: 0.01 K/CU MM
TRIGLYCERIDE, FASTING: 109 MG/DL
VITAMIN D 25-HYDROXY: 38.91 NG/ML
WBC # BLD: 6.6 K/CU MM (ref 4–10.5)

## 2021-01-06 PROCEDURE — 82330 ASSAY OF CALCIUM: CPT

## 2021-01-06 PROCEDURE — 82306 VITAMIN D 25 HYDROXY: CPT

## 2021-01-06 PROCEDURE — 80069 RENAL FUNCTION PANEL: CPT

## 2021-01-06 PROCEDURE — 36415 COLL VENOUS BLD VENIPUNCTURE: CPT

## 2021-01-06 PROCEDURE — 83970 ASSAY OF PARATHORMONE: CPT

## 2021-01-06 PROCEDURE — 80061 LIPID PANEL: CPT

## 2021-01-06 PROCEDURE — 85025 COMPLETE CBC W/AUTO DIFF WBC: CPT

## 2021-01-08 LAB — PARATHYROID HORMONE INTACT: 143 PG/ML (ref 15–65)

## 2021-01-09 ENCOUNTER — HOSPITAL ENCOUNTER (OUTPATIENT)
Dept: LAB | Age: 81
Discharge: HOME | End: 2021-01-09
Payer: MEDICARE

## 2021-01-09 PROCEDURE — U0002 COVID-19 LAB TEST NON-CDC: HCPCS

## 2021-01-09 PROCEDURE — C9803 HOPD COVID-19 SPEC COLLECT: HCPCS

## 2021-01-10 LAB
SARS-COV-2: NOT DETECTED
SOURCE: NORMAL

## 2021-01-12 LAB
CALCIUM IONIZED: 4.8 MG/DL (ref 4.48–5.28)
IONIZED CA: 1.2 MMOL/L (ref 1.12–1.32)

## 2021-01-13 ENCOUNTER — HOSPITAL ENCOUNTER (OUTPATIENT)
Dept: PULMONOLOGY | Age: 81
Discharge: HOME | End: 2021-01-13
Payer: MEDICARE

## 2021-01-13 LAB
DLCO %PRED: 39 %
DLCO PRED: NORMAL
DLCO/VA %PRED: NORMAL
DLCO/VA PRED: NORMAL
DLCO/VA: NORMAL
DLCO: NORMAL
EXPIRATORY TIME-POST: NORMAL
EXPIRATORY TIME: NORMAL
FEF 25-75% %CHNG: NORMAL
FEF 25-75% %PRED-POST: NORMAL
FEF 25-75% %PRED-PRE: NORMAL
FEF 25-75% PRED: NORMAL
FEF 25-75%-POST: NORMAL
FEF 25-75%-PRE: NORMAL
FEV1 %PRED-POST: 67 %
FEV1 %PRED-PRE: 65 %
FEV1 PRED: NORMAL
FEV1-POST: NORMAL
FEV1-PRE: NORMAL
FEV1/FVC %PRED-POST: NORMAL
FEV1/FVC %PRED-PRE: NORMAL
FEV1/FVC PRED: NORMAL
FEV1/FVC-POST: 96 %
FEV1/FVC-PRE: 95 %
FVC %PRED-POST: NORMAL
FVC %PRED-PRE: NORMAL
FVC PRED: NORMAL
FVC-POST: NORMAL
FVC-PRE: NORMAL
GAW %PRED: NORMAL
GAW PRED: NORMAL
GAW: NORMAL
IC %PRED: NORMAL
IC PRED: NORMAL
IC: NORMAL
MEP: NORMAL
MIP: NORMAL
MVV %PRED-PRE: NORMAL
MVV PRED: NORMAL
MVV-PRE: NORMAL
PEF %PRED-POST: NORMAL
PEF %PRED-PRE: NORMAL
PEF PRED: NORMAL
PEF%CHNG: NORMAL
PEF-POST: NORMAL
PEF-PRE: NORMAL
RAW %PRED: NORMAL
RAW PRED: NORMAL
RAW: NORMAL
RV %PRED: NORMAL
RV PRED: NORMAL
RV: NORMAL
SVC %PRED: NORMAL
SVC PRED: NORMAL
SVC: NORMAL
TLC %PRED: 66 %
TLC PRED: NORMAL
TLC: NORMAL
VA %PRED: NORMAL
VA PRED: NORMAL
VA: NORMAL
VTG %PRED: NORMAL
VTG PRED: NORMAL
VTG: NORMAL

## 2021-01-13 PROCEDURE — 94726 PLETHYSMOGRAPHY LUNG VOLUMES: CPT

## 2021-01-13 PROCEDURE — 94060 EVALUATION OF WHEEZING: CPT

## 2021-01-13 PROCEDURE — 94729 DIFFUSING CAPACITY: CPT

## 2021-01-13 ASSESSMENT — PULMONARY FUNCTION TESTS
FEV1/FVC_PRE: 95
FEV1/FVC_POST: 96
FEV1_PERCENT_PREDICTED_PRE: 65
FEV1_PERCENT_PREDICTED_POST: 67

## 2021-01-19 PROBLEM — N39.490 OVERFLOW INCONTINENCE OF URINE: Status: ACTIVE | Noted: 2021-01-19

## 2021-01-21 ENCOUNTER — HOSPITAL ENCOUNTER (OUTPATIENT)
Dept: SLEEP CENTER | Age: 81
Discharge: HOME | End: 2021-01-21
Payer: MEDICARE

## 2021-01-21 DIAGNOSIS — G47.33 OSA (OBSTRUCTIVE SLEEP APNEA): ICD-10-CM

## 2021-01-21 PROCEDURE — 95810 POLYSOM 6/> YRS 4/> PARAM: CPT

## 2021-01-21 ASSESSMENT — SLEEP AND FATIGUE QUESTIONNAIRES
HOW LIKELY ARE YOU TO NOD OFF OR FALL ASLEEP WHEN YOU ARE A PASSENGER IN A CAR FOR AN HOUR WITHOUT A BREAK: 1
HOW LIKELY ARE YOU TO NOD OFF OR FALL ASLEEP WHILE WATCHING TV: 2
HOW LIKELY ARE YOU TO NOD OFF OR FALL ASLEEP WHILE SITTING AND TALKING TO SOMEONE: 0
HOW LIKELY ARE YOU TO NOD OFF OR FALL ASLEEP WHILE LYING DOWN TO REST IN THE AFTERNOON WHEN CIRCUMSTANCES PERMIT: 2
ESS TOTAL SCORE: 5
HOW LIKELY ARE YOU TO NOD OFF OR FALL ASLEEP IN A CAR, WHILE STOPPED FOR A FEW MINUTES IN TRAFFIC: 0

## 2021-01-22 VITALS — BODY MASS INDEX: 42.68 KG/M2 | HEIGHT: 64 IN | WEIGHT: 250 LBS

## 2021-01-26 LAB — STATUS: NORMAL

## 2021-03-08 PROBLEM — G47.33 MILD OBSTRUCTIVE SLEEP APNEA: Status: ACTIVE | Noted: 2021-03-08

## 2021-03-08 PROBLEM — J44.9 MODERATE COPD (CHRONIC OBSTRUCTIVE PULMONARY DISEASE) (HCC): Status: ACTIVE | Noted: 2021-03-08

## 2021-03-17 ENCOUNTER — APPOINTMENT (OUTPATIENT)
Dept: CT IMAGING | Age: 81
DRG: 286 | End: 2021-03-17
Payer: MEDICARE

## 2021-03-17 ENCOUNTER — APPOINTMENT (OUTPATIENT)
Dept: GENERAL RADIOLOGY | Age: 81
DRG: 286 | End: 2021-03-17
Payer: MEDICARE

## 2021-03-17 ENCOUNTER — APPOINTMENT (OUTPATIENT)
Dept: ULTRASOUND IMAGING | Age: 81
DRG: 286 | End: 2021-03-17
Payer: MEDICARE

## 2021-03-17 ENCOUNTER — HOSPITAL ENCOUNTER (INPATIENT)
Age: 81
LOS: 6 days | Discharge: NURSING, REHAB OR LONG-TERM CARE | DRG: 286 | End: 2021-03-23
Attending: EMERGENCY MEDICINE | Admitting: INTERNAL MEDICINE
Payer: MEDICARE

## 2021-03-17 DIAGNOSIS — J81.0 ACUTE PULMONARY EDEMA (HCC): Primary | ICD-10-CM

## 2021-03-17 DIAGNOSIS — J96.21 ACUTE ON CHRONIC RESPIRATORY FAILURE WITH HYPOXIA (HCC): ICD-10-CM

## 2021-03-17 PROBLEM — R06.02 SOB (SHORTNESS OF BREATH): Status: ACTIVE | Noted: 2021-03-17

## 2021-03-17 LAB
ALBUMIN SERPL-MCNC: 3.6 GM/DL (ref 3.4–5)
ALP BLD-CCNC: 55 IU/L (ref 40–129)
ALT SERPL-CCNC: 11 U/L (ref 10–40)
ANION GAP SERPL CALCULATED.3IONS-SCNC: 13 MMOL/L (ref 4–16)
APTT: 32.1 SECONDS (ref 25.1–37.1)
AST SERPL-CCNC: 19 IU/L (ref 15–37)
BACTERIA: NEGATIVE /HPF
BASOPHILS ABSOLUTE: 0 K/CU MM
BASOPHILS RELATIVE PERCENT: 0.7 % (ref 0–1)
BILIRUB SERPL-MCNC: 1.1 MG/DL (ref 0–1)
BILIRUBIN URINE: NEGATIVE MG/DL
BLOOD, URINE: ABNORMAL
BUN BLDV-MCNC: 18 MG/DL (ref 6–23)
CALCIUM SERPL-MCNC: 9.2 MG/DL (ref 8.3–10.6)
CHLORIDE BLD-SCNC: 106 MMOL/L (ref 99–110)
CHOLESTEROL: 173 MG/DL
CLARITY: CLEAR
CO2: 24 MMOL/L (ref 21–32)
COLOR: ABNORMAL
CREAT SERPL-MCNC: 1.2 MG/DL (ref 0.6–1.1)
D DIMER: 529 NG/ML(DDU)
DIFFERENTIAL TYPE: ABNORMAL
EOSINOPHILS ABSOLUTE: 0.1 K/CU MM
EOSINOPHILS RELATIVE PERCENT: 1.9 % (ref 0–3)
FOLATE: >20 NG/ML (ref 3.1–17.5)
FOLATE: NORMAL NG/ML (ref 3.1–17.5)
GFR AFRICAN AMERICAN: 52 ML/MIN/1.73M2
GFR NON-AFRICAN AMERICAN: 43 ML/MIN/1.73M2
GLUCOSE BLD-MCNC: 102 MG/DL (ref 70–99)
GLUCOSE, URINE: NEGATIVE MG/DL
HCT VFR BLD CALC: 51.8 % (ref 37–47)
HDLC SERPL-MCNC: 64 MG/DL
HEMOGLOBIN: 15.7 GM/DL (ref 12.5–16)
IMMATURE NEUTROPHIL %: 0.2 % (ref 0–0.43)
INR BLD: 0.99 INDEX
KETONES, URINE: NEGATIVE MG/DL
LDL CHOLESTEROL DIRECT: 98 MG/DL
LEUKOCYTE ESTERASE, URINE: ABNORMAL
LYMPHOCYTES ABSOLUTE: 0.6 K/CU MM
LYMPHOCYTES RELATIVE PERCENT: 10.9 % (ref 24–44)
MCH RBC QN AUTO: 32.2 PG (ref 27–31)
MCHC RBC AUTO-ENTMCNC: 30.3 % (ref 32–36)
MCV RBC AUTO: 106.4 FL (ref 78–100)
MONOCYTES ABSOLUTE: 0.4 K/CU MM
MONOCYTES RELATIVE PERCENT: 6.7 % (ref 0–4)
MUCUS: ABNORMAL HPF
NITRITE URINE, QUANTITATIVE: NEGATIVE
NUCLEATED RBC %: 0 %
PDW BLD-RTO: 14.2 % (ref 11.7–14.9)
PH, URINE: 5 (ref 5–8)
PLATELET # BLD: 144 K/CU MM (ref 140–440)
PMV BLD AUTO: 10.2 FL (ref 7.5–11.1)
POTASSIUM SERPL-SCNC: 3.8 MMOL/L (ref 3.5–5.1)
PRO-BNP: 8865 PG/ML
PROTEIN UA: NEGATIVE MG/DL
PROTHROMBIN TIME: 12 SECONDS (ref 11.7–14.5)
RBC # BLD: 4.87 M/CU MM (ref 4.2–5.4)
RBC URINE: 39 /HPF (ref 0–6)
SARS-COV-2, NAAT: NOT DETECTED
SEGMENTED NEUTROPHILS ABSOLUTE COUNT: 4.3 K/CU MM
SEGMENTED NEUTROPHILS RELATIVE PERCENT: 79.6 % (ref 36–66)
SODIUM BLD-SCNC: 143 MMOL/L (ref 135–145)
SOURCE: NORMAL
SPECIFIC GRAVITY UA: 1.01 (ref 1–1.03)
SQUAMOUS EPITHELIAL: <1 /HPF
T4 FREE: 1.2 NG/DL (ref 0.9–1.8)
TOTAL IMMATURE NEUTOROPHIL: 0.01 K/CU MM
TOTAL NUCLEATED RBC: 0 K/CU MM
TOTAL PROTEIN: 6.6 GM/DL (ref 6.4–8.2)
TRICHOMONAS: ABNORMAL /HPF
TRIGL SERPL-MCNC: 93 MG/DL
TROPONIN T: <0.01 NG/ML
TSH HIGH SENSITIVITY: 2.71 UIU/ML (ref 0.27–4.2)
UROBILINOGEN, URINE: NEGATIVE MG/DL (ref 0.2–1)
VITAMIN B-12: >2000 PG/ML (ref 211–911)
VITAMIN B-12: NORMAL PG/ML (ref 211–911)
WBC # BLD: 5.4 K/CU MM (ref 4–10.5)
WBC UA: 1 /HPF (ref 0–5)

## 2021-03-17 PROCEDURE — 84439 ASSAY OF FREE THYROXINE: CPT

## 2021-03-17 PROCEDURE — 93005 ELECTROCARDIOGRAM TRACING: CPT | Performed by: EMERGENCY MEDICINE

## 2021-03-17 PROCEDURE — 83721 ASSAY OF BLOOD LIPOPROTEIN: CPT

## 2021-03-17 PROCEDURE — 36415 COLL VENOUS BLD VENIPUNCTURE: CPT

## 2021-03-17 PROCEDURE — 84484 ASSAY OF TROPONIN QUANT: CPT

## 2021-03-17 PROCEDURE — 85025 COMPLETE CBC W/AUTO DIFF WBC: CPT

## 2021-03-17 PROCEDURE — 2580000003 HC RX 258: Performed by: INTERNAL MEDICINE

## 2021-03-17 PROCEDURE — 6370000000 HC RX 637 (ALT 250 FOR IP): Performed by: INTERNAL MEDICINE

## 2021-03-17 PROCEDURE — 87635 SARS-COV-2 COVID-19 AMP PRB: CPT

## 2021-03-17 PROCEDURE — 87086 URINE CULTURE/COLONY COUNT: CPT

## 2021-03-17 PROCEDURE — 99285 EMERGENCY DEPT VISIT HI MDM: CPT

## 2021-03-17 PROCEDURE — 85730 THROMBOPLASTIN TIME PARTIAL: CPT

## 2021-03-17 PROCEDURE — 71250 CT THORAX DX C-: CPT

## 2021-03-17 PROCEDURE — 6360000002 HC RX W HCPCS: Performed by: EMERGENCY MEDICINE

## 2021-03-17 PROCEDURE — 71045 X-RAY EXAM CHEST 1 VIEW: CPT

## 2021-03-17 PROCEDURE — 94640 AIRWAY INHALATION TREATMENT: CPT

## 2021-03-17 PROCEDURE — 85379 FIBRIN DEGRADATION QUANT: CPT

## 2021-03-17 PROCEDURE — 2700000000 HC OXYGEN THERAPY PER DAY

## 2021-03-17 PROCEDURE — 81001 URINALYSIS AUTO W/SCOPE: CPT

## 2021-03-17 PROCEDURE — 94761 N-INVAS EAR/PLS OXIMETRY MLT: CPT

## 2021-03-17 PROCEDURE — 80061 LIPID PANEL: CPT

## 2021-03-17 PROCEDURE — 85610 PROTHROMBIN TIME: CPT

## 2021-03-17 PROCEDURE — 82607 VITAMIN B-12: CPT

## 2021-03-17 PROCEDURE — 84443 ASSAY THYROID STIM HORMONE: CPT

## 2021-03-17 PROCEDURE — 96374 THER/PROPH/DIAG INJ IV PUSH: CPT

## 2021-03-17 PROCEDURE — 80053 COMPREHEN METABOLIC PANEL: CPT

## 2021-03-17 PROCEDURE — 83880 ASSAY OF NATRIURETIC PEPTIDE: CPT

## 2021-03-17 PROCEDURE — 82746 ASSAY OF FOLIC ACID SERUM: CPT

## 2021-03-17 PROCEDURE — 93970 EXTREMITY STUDY: CPT

## 2021-03-17 PROCEDURE — 1200000000 HC SEMI PRIVATE

## 2021-03-17 RX ORDER — ACETAMINOPHEN 650 MG/1
650 SUPPOSITORY RECTAL EVERY 6 HOURS PRN
Status: DISCONTINUED | OUTPATIENT
Start: 2021-03-17 | End: 2021-03-21

## 2021-03-17 RX ORDER — FUROSEMIDE 10 MG/ML
40 INJECTION INTRAMUSCULAR; INTRAVENOUS 2 TIMES DAILY
Status: DISCONTINUED | OUTPATIENT
Start: 2021-03-18 | End: 2021-03-20

## 2021-03-17 RX ORDER — SODIUM CHLORIDE 0.9 % (FLUSH) 0.9 %
10 SYRINGE (ML) INJECTION EVERY 12 HOURS SCHEDULED
Status: DISCONTINUED | OUTPATIENT
Start: 2021-03-17 | End: 2021-03-24 | Stop reason: HOSPADM

## 2021-03-17 RX ORDER — FUROSEMIDE 10 MG/ML
40 INJECTION INTRAMUSCULAR; INTRAVENOUS ONCE
Status: COMPLETED | OUTPATIENT
Start: 2021-03-17 | End: 2021-03-17

## 2021-03-17 RX ORDER — LATANOPROST 50 UG/ML
1 SOLUTION/ DROPS OPHTHALMIC NIGHTLY
Status: DISCONTINUED | OUTPATIENT
Start: 2021-03-17 | End: 2021-03-24 | Stop reason: HOSPADM

## 2021-03-17 RX ORDER — TRAMADOL HYDROCHLORIDE 50 MG/1
50 TABLET ORAL EVERY 6 HOURS PRN
Status: DISCONTINUED | OUTPATIENT
Start: 2021-03-17 | End: 2021-03-24 | Stop reason: HOSPADM

## 2021-03-17 RX ORDER — PROMETHAZINE HYDROCHLORIDE 25 MG/1
12.5 TABLET ORAL EVERY 6 HOURS PRN
Status: DISCONTINUED | OUTPATIENT
Start: 2021-03-17 | End: 2021-03-24 | Stop reason: HOSPADM

## 2021-03-17 RX ORDER — BRIMONIDINE TARTRATE, TIMOLOL MALEATE 2; 5 MG/ML; MG/ML
1 SOLUTION/ DROPS OPHTHALMIC 2 TIMES DAILY
Status: DISCONTINUED | OUTPATIENT
Start: 2021-03-17 | End: 2021-03-17 | Stop reason: CLARIF

## 2021-03-17 RX ORDER — QUETIAPINE FUMARATE 25 MG/1
25 TABLET, FILM COATED ORAL NIGHTLY
Status: DISCONTINUED | OUTPATIENT
Start: 2021-03-17 | End: 2021-03-24 | Stop reason: HOSPADM

## 2021-03-17 RX ORDER — GABAPENTIN 300 MG/1
300 CAPSULE ORAL 3 TIMES DAILY
Status: DISCONTINUED | OUTPATIENT
Start: 2021-03-17 | End: 2021-03-24 | Stop reason: HOSPADM

## 2021-03-17 RX ORDER — BRIMONIDINE TARTRATE 2 MG/ML
1 SOLUTION/ DROPS OPHTHALMIC 2 TIMES DAILY
Status: DISCONTINUED | OUTPATIENT
Start: 2021-03-17 | End: 2021-03-24 | Stop reason: HOSPADM

## 2021-03-17 RX ORDER — POLYETHYLENE GLYCOL 3350 17 G/17G
17 POWDER, FOR SOLUTION ORAL DAILY PRN
Status: DISCONTINUED | OUTPATIENT
Start: 2021-03-17 | End: 2021-03-24 | Stop reason: HOSPADM

## 2021-03-17 RX ORDER — AMLODIPINE BESYLATE 5 MG/1
5 TABLET ORAL DAILY
Status: DISCONTINUED | OUTPATIENT
Start: 2021-03-18 | End: 2021-03-20

## 2021-03-17 RX ORDER — BUDESONIDE AND FORMOTEROL FUMARATE DIHYDRATE 160; 4.5 UG/1; UG/1
2 AEROSOL RESPIRATORY (INHALATION) 2 TIMES DAILY
Status: DISCONTINUED | OUTPATIENT
Start: 2021-03-17 | End: 2021-03-24 | Stop reason: HOSPADM

## 2021-03-17 RX ORDER — CALCITRIOL 0.25 UG/1
0.25 CAPSULE, LIQUID FILLED ORAL DAILY
Status: DISCONTINUED | OUTPATIENT
Start: 2021-03-18 | End: 2021-03-24 | Stop reason: HOSPADM

## 2021-03-17 RX ORDER — SODIUM CHLORIDE 0.9 % (FLUSH) 0.9 %
10 SYRINGE (ML) INJECTION PRN
Status: DISCONTINUED | OUTPATIENT
Start: 2021-03-17 | End: 2021-03-24 | Stop reason: HOSPADM

## 2021-03-17 RX ORDER — ACETAMINOPHEN 325 MG/1
650 TABLET ORAL EVERY 6 HOURS PRN
Status: DISCONTINUED | OUTPATIENT
Start: 2021-03-17 | End: 2021-03-18 | Stop reason: SDUPTHER

## 2021-03-17 RX ORDER — ACYCLOVIR 200 MG/1
400 CAPSULE ORAL 2 TIMES DAILY
Status: DISCONTINUED | OUTPATIENT
Start: 2021-03-17 | End: 2021-03-24 | Stop reason: HOSPADM

## 2021-03-17 RX ORDER — OXYBUTYNIN CHLORIDE 5 MG/1
5 TABLET, EXTENDED RELEASE ORAL DAILY
Status: DISCONTINUED | OUTPATIENT
Start: 2021-03-18 | End: 2021-03-24 | Stop reason: HOSPADM

## 2021-03-17 RX ORDER — CARVEDILOL 25 MG/1
25 TABLET ORAL 2 TIMES DAILY WITH MEALS
Status: DISCONTINUED | OUTPATIENT
Start: 2021-03-17 | End: 2021-03-20

## 2021-03-17 RX ORDER — ONDANSETRON 2 MG/ML
4 INJECTION INTRAMUSCULAR; INTRAVENOUS EVERY 6 HOURS PRN
Status: DISCONTINUED | OUTPATIENT
Start: 2021-03-17 | End: 2021-03-24 | Stop reason: HOSPADM

## 2021-03-17 RX ORDER — TIMOLOL MALEATE 5 MG/ML
1 SOLUTION/ DROPS OPHTHALMIC 2 TIMES DAILY
Status: DISCONTINUED | OUTPATIENT
Start: 2021-03-17 | End: 2021-03-24 | Stop reason: HOSPADM

## 2021-03-17 RX ADMIN — LATANOPROST 1 DROP: 50 SOLUTION/ DROPS OPHTHALMIC at 23:20

## 2021-03-17 RX ADMIN — CARVEDILOL 25 MG: 25 TABLET, FILM COATED ORAL at 23:20

## 2021-03-17 RX ADMIN — BUDESONIDE AND FORMOTEROL FUMARATE DIHYDRATE 2 PUFF: 160; 4.5 AEROSOL RESPIRATORY (INHALATION) at 23:40

## 2021-03-17 RX ADMIN — GABAPENTIN 300 MG: 300 CAPSULE ORAL at 23:20

## 2021-03-17 RX ADMIN — QUETIAPINE FUMARATE 25 MG: 25 TABLET ORAL at 23:20

## 2021-03-17 RX ADMIN — TIMOLOL MALEATE 1 DROP: 5 SOLUTION OPHTHALMIC at 23:20

## 2021-03-17 RX ADMIN — BRIMONIDINE TARTRATE 1 DROP: 2 SOLUTION OPHTHALMIC at 23:20

## 2021-03-17 RX ADMIN — SODIUM CHLORIDE, PRESERVATIVE FREE 10 ML: 5 INJECTION INTRAVENOUS at 23:20

## 2021-03-17 RX ADMIN — ACYCLOVIR 400 MG: 200 CAPSULE ORAL at 23:20

## 2021-03-17 RX ADMIN — FUROSEMIDE 40 MG: 10 INJECTION, SOLUTION INTRAVENOUS at 18:55

## 2021-03-17 ASSESSMENT — ENCOUNTER SYMPTOMS
EYE DISCHARGE: 0
EYE PAIN: 0
SHORTNESS OF BREATH: 1
VOMITING: 0
BACK PAIN: 0
NAUSEA: 0
RHINORRHEA: 0
SORE THROAT: 0

## 2021-03-17 ASSESSMENT — PAIN SCALES - GENERAL: PAINLEVEL_OUTOF10: 0

## 2021-03-17 NOTE — ED PROVIDER NOTES
[address]      Pt Name: Curtis Mojica  MRN: [id number]  [place] 1940  Date of evaluation: 3/17/2021  Provider: Joel Everett MD    CHIEF COMPLAINT       Chief Complaint   Patient presents with    Shortness of Breath    Leg Swelling     bilateral         HISTORY OF PRESENT ILLNESS      Curtis Mojica is a 79 y.o. female who presents to the emergency department  for   Chief Complaint   Patient presents with    Shortness of Breath    Leg Swelling     bilateral       79 yof presents reporting swelling in legs and shortness of breath. She has been having progressive symptoms over the last few weeks. She has recently started seeing a pulmonologist.  From view of notes, she has been diagnosed with pulmonary hypertension, YAJAIRA and chronic respiratory failure. She has been prescribed 2 L of supplemental oxygen at baseline. She reports that she does not tolerate her home CPAP so is not been wearing it. She states that she does have some valvular heart problems. She is not had prior heart valvular surgery. Denies a history of established CAD or MI. No history of heart failure. She states she has been on diuretic medication for a number of years. She reports significant leg swelling over the past couple weeks. She does use a walker at baseline. She reports that it is difficult to walk with her walker to the leg swelling. She denies any fever or chills. No chest pain. No abdominal pain. No nausea or vomiting. No constitutional infectious symptoms. She reports adherence with her diuretic medications. She was seen at her primary care physician's office today and was noted to be hypoxic in the 80s on 2 L. She was sent to the emergency department. Upon presentation the emergency department, she was placed on about 6 L of supplemental oxygen and saturations in the low to mid 90s. No significant signs respiratory distress.   I did speak with her cardiologist who notes that she has a history of aortic stenosis. She has not identified exacerbating alleviating factors. Nursing Notes, Triage Notes & Vital Signs were reviewed. REVIEW OF SYSTEMS    (2-9 systems for level 4, 10 or more for level 5)     Review of Systems   Constitutional: Negative for chills and fever. HENT: Negative for congestion, rhinorrhea and sore throat. Eyes: Negative for pain and discharge. Respiratory: Positive for shortness of breath. Cardiovascular: Positive for leg swelling. Negative for chest pain and palpitations. Gastrointestinal: Negative for nausea and vomiting. Endocrine: Negative for polydipsia and polyuria. Genitourinary: Negative for dysuria and flank pain. Musculoskeletal: Negative for back pain and neck pain. Skin: Negative for pallor and wound. Neurological: Negative for dizziness, facial asymmetry, light-headedness, numbness and headaches. Psychiatric/Behavioral: Negative for confusion. Except as noted above the remainder of the review of systems was reviewed and negative. PAST MEDICAL HISTORY     Past Medical History:   Diagnosis Date    Acute non Q wave MI (myocardial infarction), subsequent episode 7/15    probably demand ischemia. stress test was normal    Anemia     Arthritis     Diverticulitis     Dry eye syndrome 11/17/2015    Sees Dr Linda Webb.  On restasis    Glaucoma     [place] filter in place 2007    History of blood transfusion 2008    Hx of blood clots 2007    \"Right Leg\"  [place] filter    HX OTHER MEDICAL 10-25-13 CT Chest With Contrast    Results Include Mildly Enlarged Mediastinal , Hilar Or Supraclavicular Lymph Nodes    Hypertension     Kidney stones     Obesity (BMI 35.0-39.9 without comorbidity)     Primary insomnia 5/9/2016    Patient takes Seroquel    Sepsis due to urinary tract infection ([facility] [address] 2015    transferred to [facility] Thyroid disease     \"Took Thyroid Medications Years Ago\"    Urinary incontinence     Ventral hernia     \"Have Ventral Hernia Now, Found On CT Scan \"       Prior to Admission medications    Medication Sig Start Date End Date Taking? Authorizing Provider   SYMBICORT 160-4.5 MCG/ACT AERO Inhale 2 puffs into the lungs 2 times daily 3/8/21   José Miguel Conte MD   calcitRIOL (ROCALTROL) 0.25 MCG capsule Take 1 capsule by mouth daily 1/19/21   Rocky Frazier,    oxybutynin (DITROPAN XL) 5 MG extended release tablet Take 1 tablet by mouth daily 1/19/21   Rocky Frazier DO   vitamin D (ERGOCALCIFEROL) 1.25 MG (24986 UT) CAPS capsule Take 1 capsule by mouth once a week 1/19/21 4/19/21  Rocky Frazier DO   traMADol (ULTRAM) 50 MG tablet Take 50 mg by mouth.     Historical Provider, MD   Omega-3 Fatty Acids (FISH OIL) 1000 MG CAPS Take 1,000 mg by mouth daily    Historical Provider, MD   vitamin B-12 (CYANOCOBALAMIN) 500 MCG tablet Take 500 mcg by mouth daily    Historical Provider, MD   QUEtiapine (SEROQUEL) 50 MG tablet Take 0.5 tablets by mouth nightly 11/28/17   Bhaskar Zhou MD   gabapentin (NEURONTIN) 300 MG capsule Take 1 capsule by mouth 3 times daily 11/28/17   Bhaskar Zhou MD   furosemide (LASIX) 20 MG tablet Take 1 tablet by mouth daily 11/28/17   Bhaskar Zhou MD   carvedilol (COREG) 25 MG tablet Take 1 tablet by mouth 2 times daily (with meals) 11/28/17   Bhaskar Zhou MD   amLODIPine (NORVASC) 5 MG tablet Take 1 tablet by mouth daily 11/28/17   Bhaskar Zhou MD   LUMIGAN 0.01 % SOLN ophthalmic drops Place 1 drop into both eyes nightly  9/9/16   Historical Provider, MD   Cholecalciferol (VITAMIN D-3) 1000 UNITS CAPS Take 1,000 Units by mouth daily    Historical Provider, MD   Multiple Vitamins-Minerals (THERAPEUTIC MULTIVITAMIN-MINERALS) tablet Take 1 tablet by mouth daily    Historical Provider, MD   Ascorbic Acid (VITAMIN C) 500 MG tablet Take 500 mg by mouth daily    Historical Provider, MD   acyclovir (ZOVIRAX) 400 MG tablet Take 400 mg by mouth 2 times daily    Historical Provider, MD   brimonidine-timolol (COMBIGAN) 0.2-0.5 % ophthalmic solution Place 1 drop into both eyes every 12 hours    Historical Provider, MD   cycloSPORINE (RESTASIS) 0.05 % ophthalmic emulsion 1 drop 2 times daily    Historical Provider, MD   acetaminophen (TYLENOL) 500 MG tablet Take 500 mg by mouth every 6 hours as needed for Pain    Historical Provider, MD        Patient Active Problem List   Diagnosis    Renal calculus, left    CKD (chronic kidney disease)    Essential hypertension    Pedal edema    Neuropathy of right lower extremity    Urge incontinence of urine    Dry eye syndrome    Glaucoma    Primary insomnia    Morbid obesity with BMI of 45.0-49.9, adult ([address]    Stage 3a chronic kidney disease    Secondary hyperparathyroidism of renal origin ([facility] [address]    Chronic respiratory failure ([facility] [address]    Moderate to severe pulmonary hypertension ([facility] [address]    Overflow incontinence of urine    Mild obstructive sleep apnea    Moderate COPD [facility]ch[facility] obstructive pulmonary disease) [facility]    SOB (shortness of breath)         SURGICAL HISTORY       Past Surgical History:   Procedure Laterality Date    BREAST BIOPSY  Unsure When     Unsure Which Breast, Benign    BREAST SURGERY Right 1960    \"Infected Milk Gland\"    CHOLECYSTECTOMY  2001    Done During Gastric Bypass    COLONOSCOPY  2006    Diverticulitis    DENTAL SURGERY      Teeth Extracted In Past    FRACTURE SURGERY Right 2007    \"Right Femur Shattered\"    GASTRIC BYPASS SURGERY  2001    \"Lost About 125 Pounds, Gallbladder Also Taken Out\"    HERNIA REPAIR  2008    Abdominal Hernia Repair    HYSTERECTOMY, TOTAL ABDOMINAL  1994    JOINT REPLACEMENT Right 2007    Total Right Hip, \"Dr. Abdias Trent Cut The Nerves During The Surgery\"    JOINT REPLACEMENT Left 5-08    Total Left Knee    JOINT REPLACEMENT Right 8-08    Total Right Knee    OTHER SURGICAL HISTORY  2007    [place] Filter    OTHER SURGICAL HISTORY  2-08    \"Hardware Removed Right Femur\"    OTHER SURGICAL HISTORY Left [id number]    left percutaneous nephrostomy placed, left ureteral stent lalced,     TONSILLECTOMY AND ADENOIDECTOMY  1940's         CURRENT MEDICATIONS       Current Discharge Medication List      CONTINUE these medications which have NOT CHANGED    Details   SYMBICORT 160-4.5 MCG/ACT AERO Inhale 2 puffs into the lungs 2 times daily  Qty: 1 Inhaler, Refills: 5    Associated Diagnoses: Moderate COPD (chronic obstructive pulmonary disease) ([facility])      calcitRIOL (ROCALTROL) 0.25 MCG capsule Take 1 capsule by mouth daily  Qty: 90 capsule, Refills: 3      oxybutynin (DITROPAN XL) 5 MG extended release tablet Take 1 tablet by mouth daily  Qty: 90 tablet, Refills: 0      vitamin D (ERGOCALCIFEROL) 1.25 MG (12952 UT) CAPS capsule Take 1 capsule by mouth once a week  Qty: 12 capsule, Refills: 0      traMADol (ULTRAM) 50 MG tablet Take 50 mg by mouth.       Omega-3 Fatty Acids (FISH OIL) 1000 MG CAPS Take 1,000 mg by mouth daily      vitamin B-12 (CYANOCOBALAMIN) 500 MCG tablet Take 500 mcg by mouth daily      QUEtiapine (SEROQUEL) 50 MG tablet Take 0.5 tablets by mouth nightly  Qty: 90 tablet, Refills: 1      gabapentin (NEURONTIN) 300 MG capsule Take 1 capsule by mouth 3 times daily  Qty: 270 capsule, Refills: 1      furosemide (LASIX) 20 MG tablet Take 1 tablet by mouth daily  Qty: 90 tablet, Refills: 1      carvedilol (COREG) 25 MG tablet Take 1 tablet by mouth 2 times daily (with meals)  Qty: 180 tablet, Refills: 1      amLODIPine (NORVASC) 5 MG tablet Take 1 tablet by mouth daily  Qty: 90 tablet, Refills: 1      LUMIGAN 0.01 % SOLN ophthalmic drops Place 1 drop into both eyes nightly       Cholecalciferol (VITAMIN D-3) 1000 UNITS CAPS Take 1,000 Units by mouth daily      Multiple Vitamins-Minerals (THERAPEUTIC MULTIVITAMIN-MINERALS) tablet Take 1 tablet by mouth daily      Ascorbic Acid (VITAMIN C) 500 MG tablet Take 500 mg by mouth daily      acyclovir (ZOVIRAX) 400 MG tablet Take 400 mg by mouth 2 times daily      brimonidine-timolol (COMBIGAN) 0.2-0.5 % ophthalmic solution Place 1 drop into both eyes every 12 hours      cycloSPORINE (RESTASIS) 0.05 % ophthalmic emulsion 1 drop 2 times daily      acetaminophen (TYLENOL) 500 MG tablet Take 500 mg by mouth every 6 hours as needed for Pain             ALLERGIES     Diamox [acetazolamide]    FAMILY HISTORY       Family History   Problem Relation Age of Onset    Arthritis Mother     High Blood Pressure Mother     Vision Loss Mother         \"Glaucoma\"    Diabetes Father     Vision Loss Father     High Blood Pressure Brother     High Cholesterol Brother     High Blood Pressure Brother     High Cholesterol Brother     High Blood Pressure Brother     High Cholesterol Brother     High Blood Pressure Brother     High Cholesterol Brother     Diabetes Brother     Other Brother     Vision Loss Brother         \"Glaucoma, Cataracts\"          SOCIAL HISTORY       Social History     Socioeconomic History    Marital status:       Spouse name: None    Number of children: None    Years of education: None    Highest education level: None   Occupational History    None   Social Needs    Financial resource strain: None    Food insecurity     Worry: None     Inability: None    Transportation needs     Medical: None     Non-medical: None   Tobacco Use    Smoking status: Former Smoker     Packs/day: 2.00     Years: 28.00     Pack years: 56.00     Start date: 11/15/1960     Quit date: 11/15/1988     Years since quittin.3    Smokeless tobacco: Never Used   Substance and Sexual Activity    Alcohol use: No     Alcohol/week: 0.0 standard drinks     Comment: \"Very Rarely\"    Drug use: No    Sexual activity: Not Currently     Partners: Male   Lifestyle    Physical activity     Days per week: None     Minutes per session: None    Stress: None   Relationships    Social connections     Talks on phone: None     Gets Capillary Refill: Capillary refill takes less than 2 seconds. Findings: No erythema. Neurological:      General: No focal deficit present. Mental Status: She is alert and oriented to person, place, and time.          DIAGNOSTIC RESULTS     Labs Reviewed   COMPREHENSIVE METABOLIC PANEL W/ REFLEX TO MG FOR LOW K - Abnormal; Notable for the following components:       Result Value    CREATININE 1.2 (*)     Glucose 102 (*)     Total Bilirubin 1.1 (*)     GFR Non- 43 (*)     GFR  52 (*)     All other components within normal limits   CBC WITH AUTO DIFFERENTIAL - Abnormal; Notable for the following components:    Hematocrit 51.8 (*)     .4 (*)     MCH 32.2 (*)     MCHC 30.3 (*)     Segs Relative 79.6 (*)     Lymphocytes % 10.9 (*)     Monocytes % 6.7 (*)     All other components within normal limits   BRAIN NATRIURETIC PEPTIDE - Abnormal; Notable for the following components:    Pro-BNP 8,865 (*)     All other components within normal limits   URINE RT REFLEX TO CULTURE - Abnormal; Notable for the following components:    Color, UA STRAW (*)     Blood, Urine LARGE (*)     Leukocyte Esterase, Urine TRACE (*)     RBC, UA 39 (*)     Mucus, UA RARE (*)     All other components within normal limits   D-DIMER, QUANTITATIVE - Abnormal; Notable for the following components:    D-Dimer, Quant 529 (*)     All other components within normal limits   VITAMIN B12 & FOLATE - Abnormal; Notable for the following components:    Vitamin B-12 >2000 (*)     Folate >20.0 (*)     All other components within normal limits   COVID-19, RAPID   TROPONIN   TSH WITHOUT REFLEX   T4, FREE   VITAMIN B12 & FOLATE   LIPID PANEL   PROTIME/INR & PTT   BASIC METABOLIC PANEL   MAGNESIUM   LIPID PANEL   TSH WITHOUT REFLEX   TROPONIN   TROPONIN          EKG: All EKG's are interpreted by the Emergency Department Physician who either signs or Co-signs this chart in the absence of a cardiologist.       EKG Interpretation    Interpreted by emergency department physician    EKG is interpreted me. EKG shows sinus rhythm at 84 bpm, axis appears to be nondeviated, there are some isolated PVCs, no remarkable ST segment ovation's or depressions, there are some flattened T waves in precordial leads, NM interval of 278, QRS ration 94, QTC of 413. Final impression, nonspecific EKG. Mariangel Zambrano     RADIOLOGY:     Non-plain film images such as CT, Ultrasound and MRI are read by the radiologist. Plain radiographic images are visualized and preliminarily interpreted by the emergency physician. Interpretation per the Radiologist below, if available at the time of this note:    CT CHEST WO CONTRAST   Preliminary Result   1. Moderate to severe emphysematous changes. 2. Dilation of the pulmonary trunk which could indicate pulmonary   hypertension. Mild interlobular septal thickening could indicate   interstitial edema. 3. Dilation of the ascending thoracic aorta measuring up to 4.1 cm in   diameter. 4. Multiple enlarged right supraclavicular and cervical lymph nodes. VL DUP LOWER EXTREMITY VENOUS BILATERAL   Final Result   No evidence of DVT in either lower extremity. Technically limited study due to body habitus and edema. XR CHEST PORTABLE   Final Result   Nonspecific perihilar opacities. Atelectasis, infectious or inflammatory   airway process, and early pulmonary edema are in the differential.  Correlate   with presentation to exclude underlying pneumonia.                ED BEDSIDE ULTRASOUND:   Performed by ED Physician Mariangel Zambrano MD       LABS:  Labs Reviewed   COMPREHENSIVE METABOLIC PANEL W/ REFLEX TO MG FOR LOW K - Abnormal; Notable for the following components:       Result Value    CREATININE 1.2 (*)     Glucose 102 (*)     Total Bilirubin 1.1 (*)     GFR Non- 43 (*)     GFR  52 (*)     All other components within normal limits   CBC WITH AUTO DIFFERENTIAL - Abnormal; Notable for the following components:    Hematocrit 51.8 (*)     .4 (*)     MCH 32.2 (*)     MCHC 30.3 (*)     Segs Relative 79.6 (*)     Lymphocytes % 10.9 (*)     Monocytes % 6.7 (*)     All other components within normal limits   BRAIN NATRIURETIC PEPTIDE - Abnormal; Notable for the following components:    Pro-BNP 8,865 (*)     All other components within normal limits   URINE RT REFLEX TO CULTURE - Abnormal; Notable for the following components:    Color, UA STRAW (*)     Blood, Urine LARGE (*)     Leukocyte Esterase, Urine TRACE (*)     RBC, UA 39 (*)     Mucus, UA RARE (*)     All other components within normal limits   D-DIMER, QUANTITATIVE - Abnormal; Notable for the following components:    D-Dimer, Quant 529 (*)     All other components within normal limits   VITAMIN B12 & FOLATE - Abnormal; Notable for the following components:    Vitamin B-12 >2000 (*)     Folate >20.0 (*)     All other components within normal limits   COVID-19, RAPID   TROPONIN   TSH WITHOUT REFLEX   T4, FREE   VITAMIN B12 & FOLATE   LIPID PANEL   PROTIME/INR & PTT   BASIC METABOLIC PANEL   MAGNESIUM   LIPID PANEL   TSH WITHOUT REFLEX   TROPONIN   TROPONIN       All other labs were within normal range or not returned as of this dictation.     EMERGENCY DEPARTMENT COURSE and DIFFERENTIAL DIAGNOSIS/MDM:   Vitals:    Vitals:    03/17/21 2034 03/17/21 2104 03/17/21 2139 03/17/21 2208   BP: (!) 152/92 (!) 161/95  (!) 167/93   Pulse:    83   Resp:    18   Temp:    97.9 °F (36.6 °C)   TempSrc:    Oral   SpO2: (!) 89% 94% 94% 93%   Weight:    250 lb 3.2 oz (113.5 kg)   Height:    5' 4\" (1.626 m)           MDM  Number of Diagnoses or Management Options  Acute on chronic respiratory failure with hypoxia (HCC)  Acute pulmonary edema (HCC)  Diagnosis management comments: 71-year-old female with a history of pulmonary hypertension, chronic respiratory failure on 2 L at baseline presents for shortness of Time  DISPOSITION        Appropriate personal protective equipment was worn during the patient's evaluation. These included surgical, eye protection, surgical mask or in 95 respirator and gloves. The patient was also placed in a surgical mask for the prevention of possible spread of respiratory viral illnesses. The Patient was instructed to read the package inserts with any medication that was prescribed. Major potential reactions and medication interactions were discussed. The Patient understands that there are numerous possible adverse reactions not covered. The patient was also instructed to arrange follow-up with his or her primary care provider for review of any pending labwork or incidental findings on any radiology results that were obtained. All efforts were made to discuss any incidental findings that require further monitoring. Controlled Substances Monitoring:     No flowsheet data found.     (Please note that portions of this note were completed with a voice recognition program.  Efforts were made to edit the dictations but occasionally words are mis-transcribed.)    Adam Livingston MD (electronically signed)  Attending Emergency Physician           Adam Livingston MD  03/17/21 3100

## 2021-03-17 NOTE — CONSULTS
Will dictate full note  Send from [facility] office  Patient known to me  Discussed with ER   Admit for now   holter PAFIB hx   10/20-echo normal EF  And moderate to severe AS  [id number]

## 2021-03-17 NOTE — H&P
HISTORY AND PHYSICAL  (Hospitalist, Internal Medicine)  IDENTIFYING INFORMATION   PATIENT:  Armando Paulino  MRN:  [id number]  ADMIT DATE: 3/17/2021  TIME OF EVALUATION: 3/17/2021 6:54 PM    CHIEF COMPLAINT     Shortness of breath  HISTORY OF PRESENT ILLNESS   Armando Paulino is a 79 y.o. female admitted for shortness of breath that has been going on for last couple weeks, bilateral lower extremity not been worsening. Patient states that she has no cough associated with the shortness of breath, denies any fevers as well. She denies sick contacts, has no myalgias. Patient otherwise states that she has also difficulty laying flat, and sleeping at night because of the difficulty breathing. Complains of bilateral lower extremity swelling that is worsening. Pt otherwise has no complaints of CP, dizziness, N/V/C/D, abdominal pain, dysuria, joint pains, rash/boils, or fevers. PMH listed below:    PAST MEDICAL, SURGICAL, FAMILY, and SOCIAL HISTORY     Past Medical History:   Diagnosis Date    Acute non Q wave MI (myocardial infarction), subsequent episode 7/15    probably demand ischemia. stress test was normal    Anemia     Arthritis     Diverticulitis     Dry eye syndrome 11/17/2015    Sees Dr Rosa Anaya.  On restasis    Glaucoma     [place] filter in place 2007    History of blood transfusion 2008    Hx of blood clots 2007    \"Right Leg\"  [place] filter    HX OTHER MEDICAL 10-25-13 CT Chest With Contrast    Results Include Mildly Enlarged Mediastinal , Hilar Or Supraclavicular Lymph Nodes    Hypertension     Kidney stones     Obesity (BMI 35.0-39.9 without comorbidity)     Primary insomnia 5/9/2016    Patient takes Seroquel    Sepsis due to urinary tract infection ([address] 2015    transferred to [facility] Thyroid disease     \"Took Thyroid Medications Years Ago\"    Urinary incontinence     Ventral hernia     \"Have Ventral Hernia Now, Found On CT Scan \"     Past Surgical History: Procedure Laterality Date    BREAST BIOPSY  Unsure When     Unsure Which Breast, Benign    BREAST SURGERY Right 1960    \"Infected Milk Gland\"    CHOLECYSTECTOMY  2001    Done During Gastric Bypass    COLONOSCOPY  2006    Diverticulitis    DENTAL SURGERY      Teeth Extracted In Past    FRACTURE SURGERY Right 2007    \"Right Femur Shattered\"    GASTRIC BYPASS SURGERY  2001    \"Lost About 125 Pounds, Gallbladder Also Taken Out\"    HERNIA REPAIR  2008    Abdominal Hernia Repair    HYSTERECTOMY, TOTAL ABDOMINAL  1994    JOINT REPLACEMENT Right 2007    Total Right Hip, \"Dr. Marychuy Saavedra Cut The Nerves During The Surgery\"    JOINT REPLACEMENT Left 5-08    Total Left Knee    JOINT REPLACEMENT Right 8-08    Total Right Knee    OTHER SURGICAL HISTORY  2007    [place] Filter    OTHER SURGICAL HISTORY  2-08    \"Hardware Removed Right Femur\"    OTHER SURGICAL HISTORY Left [id number]    left percutaneous nephrostomy placed, left ureteral stent lalced,     TONSILLECTOMY AND ADENOIDECTOMY  27's     Family History   Problem Relation Age of Onset    Arthritis Mother     High Blood Pressure Mother     Vision Loss Mother         \"Glaucoma\"    Diabetes Father     Vision Loss Father     High Blood Pressure Brother     High Cholesterol Brother     High Blood Pressure Brother     High Cholesterol Brother     High Blood Pressure Brother     High Cholesterol Brother     High Blood Pressure Brother     High Cholesterol Brother     Diabetes Brother     Other Brother     Vision Loss Brother         \"Glaucoma, Cataracts\"     Family Hx of HTN  Family Hx as reviewed above, otherwise non-contributory  Social History     Socioeconomic History    Marital status:       Spouse name: None    Number of children: None    Years of education: None    Highest education level: None   Occupational History    None   Social Needs    Financial resource strain: None    Food insecurity     Worry: None     Inability: None  Transportation needs     Medical: None     Non-medical: None   Tobacco Use    Smoking status: Former Smoker     Packs/day: 2.00     Years: 28.00     Pack years: 56.00     Start date: 11/15/1960     Quit date: 11/15/1988     Years since quittin.3    Smokeless tobacco: Never Used   Substance and Sexual Activity    Alcohol use: No     Alcohol/week: 0.0 standard drinks     Comment: \"Very Rarely\"    Drug use: No    Sexual activity: Not Currently     Partners: Male   Lifestyle    Physical activity     Days per week: None     Minutes per session: None    Stress: None   Relationships    Social connections     Talks on phone: None     Gets together: None     Attends Synagogue service: None     Active member of club or organization: None     Attends meetings of clubs or organizations: None     Relationship status: None    Intimate partner violence     Fear of current or ex partner: None     Emotionally abused: None     Physically abused: None     Forced sexual activity: None   Other Topics Concern    None   Social History Narrative    None       MEDICATIONS   Medications Prior to Admission  Not in a hospital admission. Current Medications  Current Facility-Administered Medications   Medication Dose Route Frequency Provider Last Rate Last Admin    furosemide (LASIX) injection 40 mg  40 mg Intravenous Once Lorenza Esquivel MD         Current Outpatient Medications   Medication Sig Dispense Refill    SYMBICORT 160-4.5 MCG/ACT AERO Inhale 2 puffs into the lungs 2 times daily 1 Inhaler 5    calcitRIOL (ROCALTROL) 0.25 MCG capsule Take 1 capsule by mouth daily 90 capsule 3    oxybutynin (DITROPAN XL) 5 MG extended release tablet Take 1 tablet by mouth daily 90 tablet 0    vitamin D (ERGOCALCIFEROL) 1.25 MG (96911 UT) CAPS capsule Take 1 capsule by mouth once a week 12 capsule 0    traMADol (ULTRAM) 50 MG tablet Take 50 mg by mouth.       Omega-3 Fatty Acids (FISH OIL) 1000 MG CAPS Take 1,000 mg by mouth daily      vitamin B-12 (CYANOCOBALAMIN) 500 MCG tablet Take 500 mcg by mouth daily      QUEtiapine (SEROQUEL) 50 MG tablet Take 0.5 tablets by mouth nightly 90 tablet 1    gabapentin (NEURONTIN) 300 MG capsule Take 1 capsule by mouth 3 times daily 270 capsule 1    furosemide (LASIX) 20 MG tablet Take 1 tablet by mouth daily 90 tablet 1    carvedilol (COREG) 25 MG tablet Take 1 tablet by mouth 2 times daily (with meals) 180 tablet 1    amLODIPine (NORVASC) 5 MG tablet Take 1 tablet by mouth daily 90 tablet 1    LUMIGAN 0.01 % SOLN ophthalmic drops Place 1 drop into both eyes nightly       Cholecalciferol (VITAMIN D-3) 1000 UNITS CAPS Take 1,000 Units by mouth daily      Multiple Vitamins-Minerals (THERAPEUTIC MULTIVITAMIN-MINERALS) tablet Take 1 tablet by mouth daily      Ascorbic Acid (VITAMIN C) 500 MG tablet Take 500 mg by mouth daily      acyclovir (ZOVIRAX) 400 MG tablet Take 400 mg by mouth 2 times daily      brimonidine-timolol (COMBIGAN) 0.2-0.5 % ophthalmic solution Place 1 drop into both eyes every 12 hours      cycloSPORINE (RESTASIS) 0.05 % ophthalmic emulsion 1 drop 2 times daily      acetaminophen (TYLENOL) 500 MG tablet Take 500 mg by mouth every 6 hours as needed for Pain           Allergies  Allergies   Allergen Reactions    Diamox [Acetazolamide]        REVIEW OF SYSTEMS   Within above limitations. 14 point review of systems reviewed. Pertinent positive or negative as per HPI or otherwise negative per 14 point systems review. PHYSICAL EXAM     Wt Readings from Last 3 Encounters:   03/17/21 255 lb (115.7 kg)   03/08/21 250 lb (113.4 kg)   01/21/21 250 lb (113.4 kg)       Blood pressure (!) 143/86, pulse 86, temperature 98.3 °F (36.8 °C), temperature source Oral, resp. rate 16, height 5' 4\" (1.626 m), weight 255 lb (115.7 kg), SpO2 92 %. General - AAO x 3  Psych - Appropriate affect/speech. No agitation  Eyes - Eye lids intact. No scleral icterus  ENT - Lips wnl.  External ear clear/dry/intact. No thyromegaly on inspection  Neuro - No gross peripheral or central neuro deficits on inspection  Heart - Sinus. RRR. S1 and S2 present. No added HS/murmurs appreciated. +3 b/l le swelling symmetric   Lung - Adequate air entry b/l, No crackles/wheezes appreciated  GI - Soft. No guarding/rigidity. No hepatosplenomegaly/ascites. BS+   - No CVA/suprapubic tenderness or palpable bladder distension  Skin - Intact. No rash/petechiae/ecchymosis. Warm extremities  MSK - Joints with normal ROM. No joint swellings    Lines/Drains/Airways/Wounds:  [unfilled]    LABS AND IMAGING   CBC  [unfilled]    Last 3 Hemoglobin  Lab Results   Component Value Date    HGB 15.7 03/17/2021    HGB 16.4 01/06/2021    HGB 14.8 05/31/2019     Last 3 WBC/ANC  Lab Results   Component Value Date    WBC 5.4 03/17/2021    WBC 6.6 01/06/2021    WBC 5.5 05/31/2019     No components found for: GRNLOCTYABS  Last 3 Platelets  No results found for: PLATELET  Chemistry  [unfilled]  [unfilled]  No results found for: LDH  Coagulation Studies  Lab Results   Component Value Date    INR 0.99 03/17/2021     Liver Function Studies  Lab Results   Component Value Date    ALT 11 03/17/2021    AST 19 03/17/2021    ALKPHOS 55 03/17/2021       Recent Imaging        Relevant labs and imaging reviewed    ASSESSMENT AND PLAN   Jazmyn Feldman is a 79 y.o. female p/w    Acute on chronic hypoxic respiratory failure likely d/t fluid overload 2/2  CHF exacerbation - acutely decompensated, has AS and pHTN  PBNP elevated, B/L LE pitting edema,   Troponin: negative  - denied chest pain.          - admit inpatient, telemetry monitoring         - consult cardiology          - trend trop         - cont on 6 L O2 via NC         - Lasix IV BID         - c/w  BB         - monitor BNP level         - daily weights, monitor I/O         - Echo    Acute on chronic hypoxic respiratory failure also possibly d/t worsening pHTN  - pulmonary consult  - ECHO to alpesh for RVSP  - c/w prn O2    Acute on chronic hypoxic respiratory failure less likely d/t infectious etiology  -  Consider COVID, d/t hypoxia and b/l infiltrates  - pending results    Elevated d-dimer  - r/o DVT of lower ext, if no DVT unlikely PE given low Wells score  - consider VQ scan or CTPE protocol if no improvement of hypoxia from diuretic     Essential hypertension- uncontrolled  Pt is on carvedilol 25 mg bid, amlodipine and lasix, will c/w     CKD (chronic kidney disease)  - avoid nephrotoxic medication  - monitor I/Os    Neuropathy lower extremity  gabapentin 300 mg 3 times a day    Glaucoma  Using eye drops    Primary insomnia  Patient takes Seroquel 25 mg at hs    Morbid obesity with BMI of 45.0-49.9, adult ([address]  Lose weight and follow diet, counselled          Case d/w ED provider    DVT ppx: lovenox  Code status: full    [place], Internal Medicine  3/17/2021 at 6:54 PM

## 2021-03-17 NOTE — ED NOTES
Bed: ED-22  Expected date:   Expected time:   Means of arrival:   Comments:  Low O2 sat from Dr Tisha Tarango office     Mejia Ambrosio RN  03/17/21 [phone]

## 2021-03-18 ENCOUNTER — APPOINTMENT (OUTPATIENT)
Dept: NUCLEAR MEDICINE | Age: 81
DRG: 286 | End: 2021-03-18
Payer: MEDICARE

## 2021-03-18 LAB
ANION GAP SERPL CALCULATED.3IONS-SCNC: 9 MMOL/L (ref 4–16)
BASE EXCESS MIXED: 8.5 (ref 0–2.3)
BUN BLDV-MCNC: 18 MG/DL (ref 6–23)
CALCIUM SERPL-MCNC: 9 MG/DL (ref 8.3–10.6)
CARBON MONOXIDE, BLOOD: 2.7 % (ref 0–5)
CHLORIDE BLD-SCNC: 105 MMOL/L (ref 99–110)
CHOLESTEROL: 156 MG/DL
CO2 CONTENT: 37 MMOL/L (ref 19–24)
CO2: 30 MMOL/L (ref 21–32)
COMMENT: ABNORMAL
CREAT SERPL-MCNC: 1.2 MG/DL (ref 0.6–1.1)
EKG ATRIAL RATE: 84 BPM
EKG DIAGNOSIS: NORMAL
EKG P AXIS: 42 DEGREES
EKG P-R INTERVAL: 278 MS
EKG Q-T INTERVAL: 350 MS
EKG QRS DURATION: 94 MS
EKG QTC CALCULATION (BAZETT): 413 MS
EKG R AXIS: -19 DEGREES
EKG T AXIS: -31 DEGREES
EKG VENTRICULAR RATE: 84 BPM
GFR AFRICAN AMERICAN: 52 ML/MIN/1.73M2
GFR NON-AFRICAN AMERICAN: 43 ML/MIN/1.73M2
GLUCOSE BLD-MCNC: 88 MG/DL (ref 70–99)
HCO3 ARTERIAL: 35.3 MMOL/L (ref 18–23)
HDLC SERPL-MCNC: 57 MG/DL
LDL CHOLESTEROL DIRECT: 89 MG/DL
MAGNESIUM: 1.8 MG/DL (ref 1.8–2.4)
METHEMOGLOBIN ARTERIAL: 1.1 %
O2 SATURATION: 87.7 % (ref 96–97)
PCO2 ARTERIAL: 57 MMHG (ref 32–45)
PH BLOOD: 7.4 (ref 7.34–7.45)
PO2 ARTERIAL: 56 MMHG (ref 75–100)
POTASSIUM SERPL-SCNC: 3.2 MMOL/L (ref 3.5–5.1)
SODIUM BLD-SCNC: 144 MMOL/L (ref 135–145)
TRIGL SERPL-MCNC: 99 MG/DL
TROPONIN T: <0.01 NG/ML
TROPONIN T: <0.01 NG/ML
TSH HIGH SENSITIVITY: 3.1 UIU/ML (ref 0.27–4.2)

## 2021-03-18 PROCEDURE — 93308 TTE F-UP OR LMTD: CPT

## 2021-03-18 PROCEDURE — 6360000002 HC RX W HCPCS

## 2021-03-18 PROCEDURE — 37799 UNLISTED PX VASCULAR SURGERY: CPT

## 2021-03-18 PROCEDURE — 6360000002 HC RX W HCPCS: Performed by: INTERNAL MEDICINE

## 2021-03-18 PROCEDURE — 80048 BASIC METABOLIC PNL TOTAL CA: CPT

## 2021-03-18 PROCEDURE — 2700000000 HC OXYGEN THERAPY PER DAY

## 2021-03-18 PROCEDURE — 36415 COLL VENOUS BLD VENIPUNCTURE: CPT

## 2021-03-18 PROCEDURE — 94660 CPAP INITIATION&MGMT: CPT

## 2021-03-18 PROCEDURE — 1200000000 HC SEMI PRIVATE

## 2021-03-18 PROCEDURE — 94761 N-INVAS EAR/PLS OXIMETRY MLT: CPT

## 2021-03-18 PROCEDURE — B2111ZZ FLUOROSCOPY OF MULTIPLE CORONARY ARTERIES USING LOW OSMOLAR CONTRAST: ICD-10-PCS | Performed by: INTERNAL MEDICINE

## 2021-03-18 PROCEDURE — 6370000000 HC RX 637 (ALT 250 FOR IP): Performed by: INTERNAL MEDICINE

## 2021-03-18 PROCEDURE — 78580 LUNG PERFUSION IMAGING: CPT

## 2021-03-18 PROCEDURE — 3430000000 HC RX DIAGNOSTIC RADIOPHARMACEUTICAL: Performed by: INTERNAL MEDICINE

## 2021-03-18 PROCEDURE — 83721 ASSAY OF BLOOD LIPOPROTEIN: CPT

## 2021-03-18 PROCEDURE — 94640 AIRWAY INHALATION TREATMENT: CPT

## 2021-03-18 PROCEDURE — 93010 ELECTROCARDIOGRAM REPORT: CPT | Performed by: INTERNAL MEDICINE

## 2021-03-18 PROCEDURE — 2500000003 HC RX 250 WO HCPCS

## 2021-03-18 PROCEDURE — 93460 R&L HRT ART/VENTRICLE ANGIO: CPT

## 2021-03-18 PROCEDURE — A9540 TC99M MAA: HCPCS | Performed by: INTERNAL MEDICINE

## 2021-03-18 PROCEDURE — C1769 GUIDE WIRE: HCPCS

## 2021-03-18 PROCEDURE — 6360000004 HC RX CONTRAST MEDICATION

## 2021-03-18 PROCEDURE — 4A023N6 MEASUREMENT OF CARDIAC SAMPLING AND PRESSURE, RIGHT HEART, PERCUTANEOUS APPROACH: ICD-10-PCS | Performed by: INTERNAL MEDICINE

## 2021-03-18 PROCEDURE — 83735 ASSAY OF MAGNESIUM: CPT

## 2021-03-18 PROCEDURE — 2709999900 HC NON-CHARGEABLE SUPPLY

## 2021-03-18 PROCEDURE — 2580000003 HC RX 258: Performed by: INTERNAL MEDICINE

## 2021-03-18 PROCEDURE — 82803 BLOOD GASES ANY COMBINATION: CPT

## 2021-03-18 PROCEDURE — 84484 ASSAY OF TROPONIN QUANT: CPT

## 2021-03-18 PROCEDURE — 80061 LIPID PANEL: CPT

## 2021-03-18 PROCEDURE — C1894 INTRO/SHEATH, NON-LASER: HCPCS

## 2021-03-18 RX ORDER — POTASSIUM CHLORIDE 20 MEQ/1
20 TABLET, EXTENDED RELEASE ORAL 2 TIMES DAILY
Status: DISCONTINUED | OUTPATIENT
Start: 2021-03-18 | End: 2021-03-22

## 2021-03-18 RX ORDER — SODIUM CHLORIDE 0.9 % (FLUSH) 0.9 %
10 SYRINGE (ML) INJECTION EVERY 12 HOURS SCHEDULED
Status: DISCONTINUED | OUTPATIENT
Start: 2021-03-18 | End: 2021-03-24 | Stop reason: HOSPADM

## 2021-03-18 RX ORDER — ATROPINE SULFATE 0.4 MG/ML
0.5 AMPUL (ML) INJECTION
Status: ACTIVE | OUTPATIENT
Start: 2021-03-18 | End: 2021-03-18

## 2021-03-18 RX ORDER — MAGNESIUM SULFATE 1 G/100ML
1000 INJECTION INTRAVENOUS ONCE
Status: COMPLETED | OUTPATIENT
Start: 2021-03-18 | End: 2021-03-18

## 2021-03-18 RX ORDER — ACETAMINOPHEN 325 MG/1
650 TABLET ORAL EVERY 4 HOURS PRN
Status: DISCONTINUED | OUTPATIENT
Start: 2021-03-18 | End: 2021-03-24 | Stop reason: HOSPADM

## 2021-03-18 RX ORDER — MORPHINE SULFATE 2 MG/ML
1 INJECTION, SOLUTION INTRAMUSCULAR; INTRAVENOUS
Status: ACTIVE | OUTPATIENT
Start: 2021-03-18 | End: 2021-03-18

## 2021-03-18 RX ORDER — SODIUM CHLORIDE 0.9 % (FLUSH) 0.9 %
10 SYRINGE (ML) INJECTION PRN
Status: DISCONTINUED | OUTPATIENT
Start: 2021-03-18 | End: 2021-03-24 | Stop reason: HOSPADM

## 2021-03-18 RX ORDER — FUROSEMIDE 10 MG/ML
40 INJECTION INTRAMUSCULAR; INTRAVENOUS ONCE
Status: COMPLETED | OUTPATIENT
Start: 2021-03-18 | End: 2021-03-18

## 2021-03-18 RX ORDER — SODIUM CHLORIDE 9 MG/ML
INJECTION, SOLUTION INTRAVENOUS CONTINUOUS
Status: DISCONTINUED | OUTPATIENT
Start: 2021-03-18 | End: 2021-03-19

## 2021-03-18 RX ADMIN — ACYCLOVIR 400 MG: 200 CAPSULE ORAL at 08:22

## 2021-03-18 RX ADMIN — BUDESONIDE AND FORMOTEROL FUMARATE DIHYDRATE 2 PUFF: 160; 4.5 AEROSOL RESPIRATORY (INHALATION) at 20:55

## 2021-03-18 RX ADMIN — SODIUM CHLORIDE, PRESERVATIVE FREE 10 ML: 5 INJECTION INTRAVENOUS at 08:23

## 2021-03-18 RX ADMIN — QUETIAPINE FUMARATE 25 MG: 25 TABLET ORAL at 21:58

## 2021-03-18 RX ADMIN — FUROSEMIDE 40 MG: 10 INJECTION, SOLUTION INTRAMUSCULAR; INTRAVENOUS at 17:02

## 2021-03-18 RX ADMIN — Medication 6 MILLICURIE: at 15:28

## 2021-03-18 RX ADMIN — TIMOLOL MALEATE 1 DROP: 5 SOLUTION OPHTHALMIC at 08:24

## 2021-03-18 RX ADMIN — ACYCLOVIR 400 MG: 200 CAPSULE ORAL at 21:58

## 2021-03-18 RX ADMIN — CARVEDILOL 25 MG: 25 TABLET, FILM COATED ORAL at 17:02

## 2021-03-18 RX ADMIN — GABAPENTIN 300 MG: 300 CAPSULE ORAL at 08:23

## 2021-03-18 RX ADMIN — CARVEDILOL 25 MG: 25 TABLET, FILM COATED ORAL at 08:23

## 2021-03-18 RX ADMIN — AMLODIPINE BESYLATE 5 MG: 5 TABLET ORAL at 08:23

## 2021-03-18 RX ADMIN — SODIUM CHLORIDE, PRESERVATIVE FREE 10 ML: 5 INJECTION INTRAVENOUS at 21:58

## 2021-03-18 RX ADMIN — BRIMONIDINE TARTRATE 1 DROP: 2 SOLUTION OPHTHALMIC at 21:59

## 2021-03-18 RX ADMIN — CALCITRIOL CAPSULES 0.25 MCG 0.25 MCG: 0.25 CAPSULE ORAL at 08:22

## 2021-03-18 RX ADMIN — FUROSEMIDE 40 MG: 10 INJECTION, SOLUTION INTRAMUSCULAR; INTRAVENOUS at 08:22

## 2021-03-18 RX ADMIN — TRAMADOL HYDROCHLORIDE 50 MG: 50 TABLET, FILM COATED ORAL at 21:57

## 2021-03-18 RX ADMIN — GABAPENTIN 300 MG: 300 CAPSULE ORAL at 21:58

## 2021-03-18 RX ADMIN — TIMOLOL MALEATE 1 DROP: 5 SOLUTION OPHTHALMIC at 21:58

## 2021-03-18 RX ADMIN — POTASSIUM CHLORIDE 20 MEQ: 1500 TABLET, EXTENDED RELEASE ORAL at 21:58

## 2021-03-18 RX ADMIN — BRIMONIDINE TARTRATE 1 DROP: 2 SOLUTION OPHTHALMIC at 08:22

## 2021-03-18 RX ADMIN — MAGNESIUM SULFATE HEPTAHYDRATE 1000 MG: 1 INJECTION, SOLUTION INTRAVENOUS at 08:31

## 2021-03-18 RX ADMIN — POTASSIUM CHLORIDE 20 MEQ: 1500 TABLET, EXTENDED RELEASE ORAL at 08:22

## 2021-03-18 RX ADMIN — LATANOPROST 1 DROP: 50 SOLUTION/ DROPS OPHTHALMIC at 21:58

## 2021-03-18 RX ADMIN — FUROSEMIDE 40 MG: 10 INJECTION, SOLUTION INTRAMUSCULAR; INTRAVENOUS at 18:12

## 2021-03-18 RX ADMIN — BUDESONIDE AND FORMOTEROL FUMARATE DIHYDRATE 2 PUFF: 160; 4.5 AEROSOL RESPIRATORY (INHALATION) at 08:20

## 2021-03-18 RX ADMIN — ACETAMINOPHEN 650 MG: 325 TABLET ORAL at 21:57

## 2021-03-18 ASSESSMENT — PAIN DESCRIPTION - DESCRIPTORS: DESCRIPTORS: HEADACHE

## 2021-03-18 ASSESSMENT — PAIN DESCRIPTION - LOCATION: LOCATION: BACK

## 2021-03-18 ASSESSMENT — PAIN SCALES - GENERAL: PAINLEVEL_OUTOF10: 6

## 2021-03-18 NOTE — PROGRESS NOTES
Hospitalist Progress Note      Name:  Lotus Singh /Age/Sex: 1940  (79 y.o. female)   MRN & CSN:  [id number] & [id number] Admission Date/Time: 3/17/2021  3:39 PM   Location:  [address] [place] [address]-A PCP: Kyle Márquez MD         Hospital Day: 2    Assessment and Plan:   Lotus Singh is a 79 y.o.  female  who presents with SOB.     -CHF exacerbation  -Acute hypoxic RF due to CHF  CXR showed pulmonary edema. She is improving with IV lasix. O2 reqt down from 7 LPM to 2 LPM.  Plan  Continue IV lasix  ECHO  Cardiology wants to do RHC and LHC today.    -Aortic stenosis - ECHO today to reassess  -Elevated D dimer  - no DVT on US. -HTN: continue coreg, amlodipine  -CKD  -Neuropathy: gabapentin 300 mg TID  -Morbid obesity (BMI 43) - dietary and lifestyle modification advised. Diet DIET LOW SODIUM 2 GM;   DVT Prophylaxis [x] Lovenox, []  Heparin, [] SCDs, [] Ambulation   GI Prophylaxis [] PPI,  [] H2 Blocker,  [] Carafate,  [] Diet/Tube Feeds   Code Status Full Code   Disposition Home   MDM [] Low, [x] Moderate,[]  High     History of Present Illness:   Patient seen and examined. She reports her breathing is much better. Leg swelling is improving. Ten point ROS reviewed negative, unless as noted above    Objective: Intake/Output Summary (Last 24 hours) at 3/18/2021 1000  Last data filed at 3/18/2021 0841  Gross per 24 hour   Intake 350 ml   Output 2150 ml   Net -1800 ml      Vitals:   Vitals:    21 0820   BP:    Pulse:    Resp: 17   Temp:    SpO2: (!) 89%     Physical Exam:   GEN Awake female, sitting upright in bed. RESP Clear to auscultation, no wheezes, rales or rhonchi. Symmetric chest movement while on room air. CARDIO/VASC S1/S2 auscultated. Regular rate, ejection systolic murmur present. Bilateral leg edema improving. GI Abdomen is soft without significant tenderness, masses, or guarding. Bowel sounds are normoactive. MSK No gross joint deformities.   SKIN Normal coloration, warm, dry. NEURO   normal speech, no lateralizing weakness.   PSYCH Awake, alert, oriented x 3    Medications:   Medications:    potassium chloride  20 mEq Oral BID    amLODIPine  5 mg Oral Daily    calcitRIOL  0.25 mcg Oral Daily    carvedilol  25 mg Oral BID WC    gabapentin  300 mg Oral TID    latanoprost  1 drop Both Eyes Nightly    oxybutynin  5 mg Oral Daily    QUEtiapine  25 mg Oral Nightly    budesonide-formoterol  2 puff Inhalation BID    acyclovir  400 mg Oral BID    sodium chloride flush  10 mL Intravenous 2 times per day    enoxaparin  40 mg Subcutaneous Daily    furosemide  40 mg Intravenous BID    brimonidine  1 drop Both Eyes BID    And    timolol  1 drop Both Eyes BID      Infusions:   PRN Meds: traMADol, 50 mg, Q6H PRN  sodium chloride flush, 10 mL, PRN  promethazine, 12.5 mg, Q6H PRN    Or  ondansetron, 4 mg, Q6H PRN  polyethylene glycol, 17 g, Daily PRN  acetaminophen, 650 mg, Q6H PRN    Or  acetaminophen, 650 mg, Q6H PRN        CBC   Recent Labs     03/17/21  1643   WBC 5.4   HGB 15.7   HCT 51.8*         BMP   Recent Labs     03/17/21  1643 03/18/21  0318    144   K 3.8 3.2*    105   CO2 24 30   BUN 18 18   CREATININE 1.2* 1.2*       Radiology report reviewed     Electronically signed by Kylah Triplett MD on 3/18/2021 at 10:00 AM

## 2021-03-18 NOTE — CONSULTS
Comprehensive Nutrition Assessment    Type and Reason for Visit:  Patient Education    Nutrition Recommendations/Plan:   Continue current diet as ordered  Encourage po intake as able  Please document all po intake  Will f/u for any additional diet education needs per protocol    Nutrition Assessment:  Pt admitted for acute pulmonary edema, PMH: Anemia, HTN, thyroid disease, h/o gastric bypass, pt currently on 2 GM sodium diet, no po intake documented at this time, pt on 2 L NC, pt in contact isolation so NCM \"Low Sodium Nutrition Therapy\" handout sent with meal tray, pt at moderate nutrition risk    Malnutrition Assessment:  Malnutrition Status: At risk for malnutrition (Comment)    Context:  Acute Illness       Estimated Daily Nutrient Needs:  Energy (kcal):  6549-0500([address] T[address] Busman w/ stress factor 1.0-1.2); Weight Used for Energy Requirements:  Current     Protein (g):  61-72(1.1-1.3 g/kg); Weight Used for Protein Requirements:  Ideal        Fluid (ml/day):  4370-5721; Method Used for Fluid Requirements:  1 ml/kcal      Nutrition Related Findings:  Labs: K+ 3.2, Cr 1.2      Wounds:  None       Current Nutrition Therapies:    DIET LOW SODIUM 2 GM;     Anthropometric Measures:  · Height: 5' 4.02\" (162.6 cm)  · Current Body Weight: 250 lb 3.6 oz (113.5 kg)   · Admission Body Weight: (n/a)    · Usual Body Weight: (n/a)     · Ideal Body Weight: 120 lbs; % Ideal Body Weight 208.5 %   · BMI: 42.9  · BMI Categories: Obese Class 3 (BMI 40.0 or greater)       Nutrition Diagnosis:   · Food & Nutrition-related knowledge deficit related to cardiac dysfunction as evidenced by dietitian consult for diet education    Nutrition Interventions:   Food and/or Nutrient Delivery:  Continue Current Diet  Nutrition Education/Counseling:  Education initiated   Coordination of Nutrition Care:  Continue to monitor while inpatient    Goals:  pt will consume greater than 50% of meals       Nutrition Monitoring and Evaluation:

## 2021-03-18 NOTE — PLAN OF CARE
Problem: Falls - Risk of:  Goal: Will remain free from falls  Description: Will remain free from falls  Outcome: Ongoing  Goal: Absence of physical injury  Description: Absence of physical injury  Outcome: Ongoing     Problem: Fluid Volume:  Goal: Hemodynamic stability will improve  Description: Hemodynamic stability will improve  Outcome: Ongoing  Goal: Ability to maintain a balanced intake and output will improve  Description: Ability to maintain a balanced intake and output will improve  Outcome: Ongoing     Problem: Health Behavior:  Goal: Identification of resources available to assist in meeting health care needs will improve  Description: Identification of resources available to assist in meeting health care needs will improve  Outcome: Ongoing     Problem: Respiratory:  Goal: Respiratory status will improve  Description: Respiratory status will improve  Outcome: Ongoing

## 2021-03-18 NOTE — PROGRESS NOTES
Daily Progress Note    Feeling better  Still has edema in legs but breathing improved  Check echo today  For right and left heart cath  Hx of DVT In remote past, had IVC filter  Moderate to Severe  AS will evaluate  Keep on diuretics for now  Hx of COPD sees        CT chest   Impression:        1. Moderate to severe emphysematous changes. 2. Dilation of the pulmonary trunk which could indicate pulmonary   hypertension.  Mild interlobular septal thickening could indicate   interstitial edema. 3. Dilation of the ascending thoracic aorta measuring up to 4.1 cm in   diameter. 4. Multiple enlarged right supraclavicular and cervical lymph nodes. Us of legs  Impression:        No evidence of DVT in either lower extremity. Technically limited study due to body habitus and edema. Pt. Awake, alert and feeling ok  HR stable, BP stable  No CP, still with SOB this am    Acute on Chronic HFpEF    Worsening SOB and LE edema noted    Has been on O2 the last 2-3 weeks    Elevated BNP    Trop neg. CT chest W/O non-acute    No DVT noted-unlikely for PE    Echo done OP and Mod-severe AS noted-possible cause of HF     Repeat echo today    Planning for LHC/RHC today for further evaluation and to complete w/u for AS- possibly may need TAVR    Consent signed and placed in soft chart    Will follow  Further recs after cath today    PAST MEDICAL HISTORY:  History of having aortic stenosis present,  history of hypertension, history of having questionable COPD present,  arthritis, she had [place] filter placement in the past for DVT. She  had kidney stones and she had I think renal failure and she was on _____  in the remote past also.   This was in 2015, but she has recovered well.     PAST SURGICAL HISTORY:  Nghia filter placement, gallbladder  surgery, and hysterectomy done.     SOCIAL HISTORY:  She does not smoke, does not drink.     ALLERGIES:  DIAMOX.     MEDICATIONS AT HOME:  She is on Glucotrol, Ditropan, Ultram, fish oil,  Neurontin, Seroquel, Lasix, and Coreg. Objective:   BP (!) 147/67   Pulse 74   Temp 98.1 °F (36.7 °C) (Oral)   Resp 17   Ht 5' 4\" (1.626 m)   Wt 250 lb 3.2 oz (113.5 kg)   SpO2 (!) 89%   BMI 42.95 kg/m²       Intake/Output Summary (Last 24 hours) at 3/18/2021 0844  Last data filed at 3/18/2021 0841  Gross per 24 hour   Intake 350 ml   Output 2150 ml   Net -1800 ml       Medications:   Scheduled Meds:   potassium chloride  20 mEq Oral BID    magnesium sulfate  1,000 mg Intravenous Once    amLODIPine  5 mg Oral Daily    calcitRIOL  0.25 mcg Oral Daily    carvedilol  25 mg Oral BID WC    gabapentin  300 mg Oral TID    latanoprost  1 drop Both Eyes Nightly    oxybutynin  5 mg Oral Daily    QUEtiapine  25 mg Oral Nightly    budesonide-formoterol  2 puff Inhalation BID    acyclovir  400 mg Oral BID    sodium chloride flush  10 mL Intravenous 2 times per day    enoxaparin  40 mg Subcutaneous Daily    furosemide  40 mg Intravenous BID    brimonidine  1 drop Both Eyes BID    And    timolol  1 drop Both Eyes BID      Infusions:     PRN Meds:  traMADol, sodium chloride flush, promethazine **OR** ondansetron, polyethylene glycol, acetaminophen **OR** acetaminophen       Physical Exam:  Vitals:    03/18/21 0820   BP:    Pulse:    Resp: 17   Temp:    SpO2: (!) 89%        General: AAO, NAD  Chest: Nontender  Cardiac: First and Second Heart Sounds are Normal, No Murmurs or Gallops noted  Lungs:Clear to auscultation and percussion. Abdomen: Soft, NT, ND, +BS  Extremities: No clubbing, no edema  Vascular:  Equal 2+ peripheral pulses.         Lab Data:  CBC:   Recent Labs     03/17/21  1643   WBC 5.4   HGB 15.7   HCT 51.8*   .4*        BMP:   Recent Labs     03/17/21  1643 03/18/21  0318    144   K 3.8 3.2*    105   CO2 24 30   BUN 18 18   CREATININE 1.2* 1.2*     LIVER PROFILE:   Recent Labs     03/17/21  1643   AST 19   ALT 11   BILITOT 1.1* ALKPHOS 55     PT/INR:   Recent Labs     03/17/21  1643   PROTIME 12.0   INR 0.99     APTT:   Recent Labs     03/17/21  1643   APTT 32.1     BNP:  No results for input(s): BNP in the last 72 hours.       Assessment:  Patient Active Problem List    Diagnosis Date Noted    SOB (shortness of breath) 03/17/2021    Mild obstructive sleep apnea 03/08/2021    Moderate COPD (chronic obstructive pulmonary disease) ([facility] [address] 03/08/2021    Overflow incontinence of urine 01/19/2021    Chronic respiratory failure ([facility] 01/04/2021    Moderate to severe pulmonary hypertension ([facility] [facility] 01/04/2021    Stage 3a chronic kidney disease 10/12/2020    Secondary hyperpar[facility]roidism of renal origin [facility] 10/12/2020    Morbid obesity with BMI of 45.0-49.9, adult ([facility] [facility] 09/17/2017    Primary insomnia 05/09/2016    Urge incontinence of urine 11/17/2015    Dry eye syndrome 11/17/2015    Glaucoma 11/17/2015    Essential hypertension 10/13/2015    Pedal edema 10/13/2015    Neuropathy of right lower extremity 10/13/2015    CKD (chronic kidney disease) 07/16/2015    Renal calculus, left        Electronically signed by Mikki Tolentino PA-C on 3/18/2021 at 8:44 AM

## 2021-03-18 NOTE — FLOWSHEET NOTE
Patient transferred to Nuclear Medicine at this time per Dr. Ernst Lipoma order. RFA procedure site: WN. No distress noted upon transport.

## 2021-03-18 NOTE — PROGRESS NOTES
PLAN FOR [facility]   SPOKE WITH BROTHER JD--[phone]  CONSENT FOR [facility]   PLAN FOR [facility] TOMORROW

## 2021-03-18 NOTE — OP NOTE
Operative Note  LE      Patient: Marion Loving  YOB: 1940  MRN: [id number]    Date of Procedure:   3/18/21    Pre-Op Diagnosis: chest pain and dyspnea     Post-Op Diagnosis: Same        Estimated Blood Loss (mL): less than 50     Complications: None    Electronically signed by Heather Ware MD on 3/18/2021 at 1:37 PM     [facility] -[phone]  LEFT MAIN PATENT  LAD MILD DX  LCX MILD DX  RCA MILD DX  LVEDP 30  SEVERE ELEVATED RIGHT HEART PRESSURES  CHECK [facility] FOR MR AND HAS MODERATE AS  DIURESIS FOR NOW  RIGHT GROIN   NO COMPLICATIONS

## 2021-03-18 NOTE — PLAN OF CARE
Problem: Falls - Risk of:  Goal: Will remain free from falls  Description: Will remain free from falls  3/18/2021 1718 by Vladimir Isaac  Outcome: Ongoing  3/18/2021 0605 by Rene Mei RN  Outcome: Ongoing  Goal: Absence of physical injury  Description: Absence of physical injury  3/18/2021 1718 by Vladimir Isaac  Outcome: Ongoing  3/18/2021 0605 by Rene Mei RN  Outcome: Ongoing     Problem: Fluid Volume:  Goal: Hemodynamic stability will improve  Description: Hemodynamic stability will improve  3/18/2021 1718 by Vladimir Isaac  Outcome: Ongoing  3/18/2021 0605 by Rene Mei RN  Outcome: Ongoing  Goal: Ability to maintain a balanced intake and output will improve  Description: Ability to maintain a balanced intake and output will improve  3/18/2021 1718 by Vladimir Isaac  Outcome: Ongoing  3/18/2021 0605 by Rene Mei RN  Outcome: Ongoing     Problem: Health Behavior:  Goal: Identification of resources available to assist in meeting health care needs will improve  Description: Identification of resources available to assist in meeting health care needs will improve  3/18/2021 1718 by Vladimir Isaac  Outcome: Ongoing  3/18/2021 0605 by Rene Mei RN  Outcome: Ongoing     Problem: Respiratory:  Goal: Respiratory status will improve  Description: Respiratory status will improve  3/18/2021 1718 by Vladimir Isaac  Outcome: Ongoing  3/18/2021 0605 by Rene Mei RN  Outcome: Ongoing

## 2021-03-18 NOTE — FLOWSHEET NOTE
Received from Cath Lab at this time per Dr. Childers Emily order. RFA procedure site free of bleeding, oozing, and hematoma. Complete bed change completed. Patient will return to room 3021 following observation in [facility].

## 2021-03-18 NOTE — CONSULTS
[address] [place] [address], [address] [facility]                                  CONSULTATION    PATIENT NAME: Edward Santillan                   :        1940  MED REC NO:   [id number]                          ROOM:       2767  ACCOUNT NO:   [de-identified]                           ADMIT DATE: 2021  PROVIDER:     Pat Hoover MD    CONSULT DATE:  2021    INDICATION:  Shortness of breath. HISTORY OF PRESENT ILLNESS:  This is a 69-year-old female patient with a  history of having aortic stenosis present. The patient has  moderate-to-severe aortic stenosis noted. She has been doing okay. She  sees Dr. Lo Townsend for her lung issues. She has been on oxygen for the  last two to three weeks. Last two to three weeks, she was getting  progressively short of breath. She had seen Dr. Kajal Parker today. She was  in significant dyspnea; therefore sent to the hospital for reevaluation. She has anasarca present. Her oxygen level dropped to 82% and she had  qualified for home oxygen. PAST MEDICAL HISTORY:  History of having aortic stenosis present,  history of hypertension, history of having questionable COPD present,  arthritis, she had [place] filter placement in the past for DVT. She  had kidney stones and she had I think renal failure and she was on _____  in the remote past also. This was in , but she has recovered well. PAST SURGICAL HISTORY:  [place] filter placement, gallbladder  surgery, and hysterectomy done. SOCIAL HISTORY:  She does not smoke, does not drink. ALLERGIES:  DIAMOX. MEDICATIONS AT HOME:  She is on Glucotrol, Ditropan, Ultram, fish oil,  Neurontin, Seroquel, Lasix, and Coreg. PHYSICAL EXAMINATION:  GENERAL:  The patient is awake, alert, and answering questions, not in  acute distress. She is on oxygen. VITAL SIGNS:  Temperature is afebrile, pulse is 80, blood pressure  143/86.   HEENT: Head is normocephalic and atraumatic. Pupils are equal and  reactive to light. CHEST:  Equal expansion. LUNGS:  Clear to auscultation. No wheezing or rhonchi appreciated. HEART:  Regular rate and rhythm. She has a murmur present. 3/6  systolic ejection murmur present and 3+ edema present. NEUROLOGIC:  Cranial nerves II through XII are grossly intact. IMPRESSION:  This is a 67-year-old female patient with a history of  aortic stenosis noted. Comes in with shortness of breath present. Getting progressively short of breath. I believe she is in heart  failure, but most likely secondary to aortic stenosis. The plan is for  heart cath. We will wait for her labs to come back. We will make  further recommendations based on the hospital course.         Monica Leiva MD    D: 03/17/2021 17:53:04       T: 03/17/2021 19:17:28     [email or web address]  Job#: [id number]     Doc#: [id number]    CC:

## 2021-03-18 NOTE — PROCEDURES
[address] [facility]               [address] [facility], [address] [facility]                            CARDIAC CATHETERIZATION    PATIENT NAME: Gustavo Olsen                   :        1940  MED REC NO:   [id number]                          ROOM:  ACCOUNT NO:   [de-identified]                           ADMIT DATE: 2021  PROVIDER:     Shilpi Knapp MD    DATE OF PROCEDURE:  2021    INDICATION:  Heart failure. DESCRIPTION OF PROCEDURE:  This is a 43-year-old female patient who was  brought to cath lab today. Informed consent was obtained from the  patient. The patient was prepped and draped in sterile fashion. The  patient was injected with 20 mL of 2% lidocaine in the right femoral  artery region. Using a micropuncture needle, right femoral artery was  canalized and a 6-Colomb[place] arterial sheath was placed in the right femoral  artery and a 5-Colombian venous sheath was placed. Right heart catheterization was performed. RA pressure was 17/15 with a  mean of 11. RV pressure was 66/2 with mean of 33. PA pressure was  73/29 with a mean of 48 present. Aortic valve was crossed using AL1  catheter and simultaneous pressures were recorded with LV and aortic  pressures. LVEDP was around 30 mmHg present. There was about a 20 mmHg  gradient present across the aortic valve. Aortic sat was 91% on 5  liters and PA sat was 66%. The patient has IVC filter present, crossed in the floor. Using a JL4, 4-Colombian catheter, left coronary angiogram was performed. Left coronary angiogram revealed the left main is patent. It bifurcates  in the LAD and circumflex artery. Left main is patent. Circ is a  medium-sized vessel, gives off a medium-sized OM branch. Mild disease  noted. LAD is a medium-sized vessel, reaches and wraps the apex. It  gives off small diagonal branch, mild disease present. Using a [address] [place] [address] catheter, right coronary angiogram was performed. Right  coronary angiogram revealed the right coronary artery is a medium-sized  vessel, gives off the PDA and PL branch, dominant vessel, mild disease  present. IMPRESSION:  1. Left main is patent. 2.  LAD, circ, and RCA they have mild disease noted. 3.  Moderate aortic stenosis noted. Mean gradient across the aortic  valve is around 20 mmHg present. 4.  EDP is around 30 mmHg present. 5.  Severe pulmonary hypertension noted with right heart pressures  elevated. We will plan for ORVILLE to evaluate the mitral valve. The patient appears to have biventricular dysfunction with elevated EDP  and severe right heart pressures elevated. The patient may need a CT also.     BLOOD LOSS 20CC      Dorothy Cagle MD    D: 03/18/2021 13:41:51       T: 03/18/2021 15:23:41     [email or web address]  Job#: [id number]     Doc#: [id number]    CC:

## 2021-03-19 LAB
ANION GAP SERPL CALCULATED.3IONS-SCNC: 9 MMOL/L (ref 4–16)
BUN BLDV-MCNC: 17 MG/DL (ref 6–23)
CALCIUM SERPL-MCNC: 8.8 MG/DL (ref 8.3–10.6)
CHLORIDE BLD-SCNC: 104 MMOL/L (ref 99–110)
CO2: 35 MMOL/L (ref 21–32)
CREAT SERPL-MCNC: 1.3 MG/DL (ref 0.6–1.1)
GFR AFRICAN AMERICAN: 48 ML/MIN/1.73M2
GFR NON-AFRICAN AMERICAN: 40 ML/MIN/1.73M2
GLUCOSE BLD-MCNC: 86 MG/DL (ref 70–99)
MAGNESIUM: 2 MG/DL (ref 1.8–2.4)
POTASSIUM SERPL-SCNC: 3.9 MMOL/L (ref 3.5–5.1)
SODIUM BLD-SCNC: 148 MMOL/L (ref 135–145)

## 2021-03-19 PROCEDURE — 6370000000 HC RX 637 (ALT 250 FOR IP): Performed by: INTERNAL MEDICINE

## 2021-03-19 PROCEDURE — 80048 BASIC METABOLIC PNL TOTAL CA: CPT

## 2021-03-19 PROCEDURE — 94660 CPAP INITIATION&MGMT: CPT

## 2021-03-19 PROCEDURE — 2580000003 HC RX 258: Performed by: INTERNAL MEDICINE

## 2021-03-19 PROCEDURE — 83735 ASSAY OF MAGNESIUM: CPT

## 2021-03-19 PROCEDURE — 6360000002 HC RX W HCPCS: Performed by: INTERNAL MEDICINE

## 2021-03-19 PROCEDURE — 2500000003 HC RX 250 WO HCPCS: Performed by: INTERNAL MEDICINE

## 2021-03-19 PROCEDURE — 1200000000 HC SEMI PRIVATE

## 2021-03-19 PROCEDURE — 93312 ECHO TRANSESOPHAGEAL: CPT

## 2021-03-19 PROCEDURE — 94640 AIRWAY INHALATION TREATMENT: CPT

## 2021-03-19 PROCEDURE — 7100000000 HC PACU RECOVERY - FIRST 15 MIN

## 2021-03-19 PROCEDURE — 7100000001 HC PACU RECOVERY - ADDTL 15 MIN

## 2021-03-19 PROCEDURE — B24BZZ4 ULTRASONOGRAPHY OF HEART WITH AORTA, TRANSESOPHAGEAL: ICD-10-PCS | Performed by: INTERNAL MEDICINE

## 2021-03-19 PROCEDURE — 36415 COLL VENOUS BLD VENIPUNCTURE: CPT

## 2021-03-19 RX ORDER — FLUMAZENIL 0.1 MG/ML
0.5 INJECTION, SOLUTION INTRAVENOUS ONCE
Status: COMPLETED | OUTPATIENT
Start: 2021-03-19 | End: 2021-03-19

## 2021-03-19 RX ORDER — NALOXONE HYDROCHLORIDE 0.4 MG/ML
0.4 INJECTION, SOLUTION INTRAMUSCULAR; INTRAVENOUS; SUBCUTANEOUS PRN
Status: DISCONTINUED | OUTPATIENT
Start: 2021-03-19 | End: 2021-03-24 | Stop reason: HOSPADM

## 2021-03-19 RX ADMIN — BRIMONIDINE TARTRATE 1 DROP: 2 SOLUTION OPHTHALMIC at 10:17

## 2021-03-19 RX ADMIN — ACYCLOVIR 400 MG: 200 CAPSULE ORAL at 10:14

## 2021-03-19 RX ADMIN — CALCITRIOL CAPSULES 0.25 MCG 0.25 MCG: 0.25 CAPSULE ORAL at 10:16

## 2021-03-19 RX ADMIN — BRIMONIDINE TARTRATE 1 DROP: 2 SOLUTION OPHTHALMIC at 21:06

## 2021-03-19 RX ADMIN — GABAPENTIN 300 MG: 300 CAPSULE ORAL at 21:06

## 2021-03-19 RX ADMIN — SODIUM CHLORIDE, PRESERVATIVE FREE 10 ML: 5 INJECTION INTRAVENOUS at 21:06

## 2021-03-19 RX ADMIN — AMLODIPINE BESYLATE 5 MG: 5 TABLET ORAL at 10:13

## 2021-03-19 RX ADMIN — FUROSEMIDE 40 MG: 10 INJECTION, SOLUTION INTRAMUSCULAR; INTRAVENOUS at 18:29

## 2021-03-19 RX ADMIN — NALOXONE HYDROCHLORIDE 0.4 MG: 0.4 INJECTION, SOLUTION INTRAMUSCULAR; INTRAVENOUS; SUBCUTANEOUS at 08:39

## 2021-03-19 RX ADMIN — TIMOLOL MALEATE 1 DROP: 5 SOLUTION OPHTHALMIC at 21:06

## 2021-03-19 RX ADMIN — FLUMAZENIL 0.5 MG: 0.1 INJECTION, SOLUTION INTRAVENOUS at 08:39

## 2021-03-19 RX ADMIN — FUROSEMIDE 40 MG: 10 INJECTION, SOLUTION INTRAMUSCULAR; INTRAVENOUS at 10:17

## 2021-03-19 RX ADMIN — TIMOLOL MALEATE 1 DROP: 5 SOLUTION OPHTHALMIC at 10:18

## 2021-03-19 RX ADMIN — POTASSIUM CHLORIDE 20 MEQ: 1500 TABLET, EXTENDED RELEASE ORAL at 21:06

## 2021-03-19 RX ADMIN — GABAPENTIN 300 MG: 300 CAPSULE ORAL at 15:26

## 2021-03-19 RX ADMIN — CARVEDILOL 25 MG: 25 TABLET, FILM COATED ORAL at 10:16

## 2021-03-19 RX ADMIN — ENOXAPARIN SODIUM 40 MG: 40 INJECTION SUBCUTANEOUS at 10:16

## 2021-03-19 RX ADMIN — POTASSIUM CHLORIDE 20 MEQ: 1500 TABLET, EXTENDED RELEASE ORAL at 10:16

## 2021-03-19 RX ADMIN — ACETAMINOPHEN 650 MG: 325 TABLET ORAL at 21:06

## 2021-03-19 RX ADMIN — ACYCLOVIR 400 MG: 200 CAPSULE ORAL at 21:06

## 2021-03-19 RX ADMIN — SODIUM CHLORIDE, PRESERVATIVE FREE 10 ML: 5 INJECTION INTRAVENOUS at 10:18

## 2021-03-19 RX ADMIN — CARVEDILOL 25 MG: 25 TABLET, FILM COATED ORAL at 18:28

## 2021-03-19 RX ADMIN — BUDESONIDE AND FORMOTEROL FUMARATE DIHYDRATE 2 PUFF: 160; 4.5 AEROSOL RESPIRATORY (INHALATION) at 20:04

## 2021-03-19 RX ADMIN — TRAMADOL HYDROCHLORIDE 50 MG: 50 TABLET, FILM COATED ORAL at 16:19

## 2021-03-19 RX ADMIN — QUETIAPINE FUMARATE 25 MG: 25 TABLET ORAL at 21:06

## 2021-03-19 RX ADMIN — LATANOPROST 1 DROP: 50 SOLUTION/ DROPS OPHTHALMIC at 21:06

## 2021-03-19 ASSESSMENT — PAIN DESCRIPTION - FREQUENCY: FREQUENCY: CONTINUOUS

## 2021-03-19 ASSESSMENT — PAIN SCALES - GENERAL: PAINLEVEL_OUTOF10: 6

## 2021-03-19 ASSESSMENT — PAIN DESCRIPTION - PAIN TYPE
TYPE: ACUTE PAIN
TYPE: ACUTE PAIN

## 2021-03-19 ASSESSMENT — PAIN DESCRIPTION - LOCATION
LOCATION: LEG
LOCATION: HEAD

## 2021-03-19 NOTE — PROCEDURES
[address], [address] [place] [address]                                 ECHOCARDIOGRAM    PATIENT NAME: Jo-Ann George                   :        1940  MED REC NO:   [id number]                          ROOM:       6464  ACCOUNT NO:   [de-identified]                           ADMIT DATE: 2021  PROVIDER:     Suzette Bui MD    DATE OF PROCEDURE:  2021    PROCEDURE:  ORVILLE.    INDICATION:  Aortic stenosis. DESCRIPTION OF PROCEDURE:  This is a 61-year-old female patient brought  to the noninvasive lab today. The patient received 4 mg of Versed and  50 mcg of fentanyl and both were reversed at the end of the procedure. The posterior pharynx was anesthetized using lidocaine and viscous gel. A mouth guard was placed. ORVILLE probe was advanced to the posterior  pharynx and mid esophagus. Images were obtained. FINDINGS:  Left atrium was moderately enlarged, but no clot or thrombus  noted in the left atrium or left atrial appendage. Moderate amount of  mitral annular calcification noted, mild mitral regurgitation noted. LV  function appears grossly normal.  No ASD or PFO was present. Color  Doppler and bubble study both were negative. Tricuspid valve is normal.  Mild tricuspid regurgitation noted. Aortic valve is sclerotic with  moderate aortic stenosis present and trace aortic regurgitation present  and ascending aorta was normal.  No pericardial effusion noted. IMPRESSION:  1. No clot or thrombus noted in the left atrium, left atrial appendage,  LV cavity. 2.  Moderate mitral annular calcification noted with mild mitral  regurgitation present. 3.  EF is around 50% range. 4.  No pericardial effusion noted. 5.  No ASD or PFO was noted. 6.  Tricuspid valve and pulmonic valve are grossly normal.  7.  Moderate aortic stenosis noted. Aortic valve is sclerotic with  trace aortic regurgitation present.     The patient tolerated the procedure well. No complication noted. ORVILLE  probe was removed with no complications.         Everett Madison MD    D: 03/19/2021 8:27:43       T: 03/19/2021 12:18:24     [email or web address]  Job#: [id number]     Doc#: [id number]    CC:

## 2021-03-19 NOTE — CARE COORDINATION
CM in to see Pt to discuss discharge planning. Pt sleeping soundly and unable to participate. CM call to Pt brother, aMlka Adams, to verify Pt information. Pt from home alone in 1 floor handicap accessible home. Discharge plan is home, Pt family will provide transportation at discharge. Pt has DME to include cane and walker. Pt uses walker most of the time. Pt still drives. Pt has not had HC in the past.      Pt has insurance, pcp, and can afford medications.   CM following for any needs

## 2021-03-19 NOTE — PROGRESS NOTES
Daily Progress Note     patient is awake alert  No chest pain , no dyspnea  Has good urine output is good  ORVILLE showed moderate AS EF is normal moderate MA and pulmonary HTN  She is not tolerating BIPAP well  Leg edema improved with diuretics  From cardiac stand keep on diuretics, cor-pulmonale   V/q negative for PE  Supportive care - prognosis is guarded  increase activity     Cath-DICTATED -[id number]  LEFT MAIN PATENT  LAD MILD DX  LCX MILD DX  RCA MILD DX  LVEDP 30  SEVERE ELEVATED RIGHT HEART PRESSURES  CHECK ORVILLE FOR MR AND HAS MODERATE AS  DIURESIS FOR NOW  RIGHT GROIN   NO COMPLICATIONS        Summary   This is a limited echocardiogram.   Technically difficult examination due to body habitus. Left ventricular systolic function is low normal.   Ejection fraction is visually estimated at 50%. Bowing of the interventricular septum toward the left heart due to right   ventricular volume/pressure overload. Mild left ventricular hypertrophy. Moderate aortic stenosis with mean gradient of 31 mmHg. JULIETA: 1.37cm2. Mild to moderate tricuspid regurgitation; RVSP: 64 mmHg. No evidence of any pericardial effusion.    Moderate MAC noted -mean PG is 5-moderate MS noted      Objective:   /62   Pulse 80   Temp 97.6 °F (36.4 °C) (Oral)   Resp 20   Ht 5' 4.02\" (1.626 m)   Wt 243 lb 14.4 oz (110.6 kg)   SpO2 92%   BMI 41.84 kg/m²       Intake/Output Summary (Last 24 hours) at 3/19/2021 0957  Last data filed at 3/19/2021 0330  Gross per 24 hour   Intake 240 ml   Output 2050 ml   Net -1810 ml       Medications:   Scheduled Meds:   potassium chloride  20 mEq Oral BID    sodium chloride flush  10 mL Intravenous 2 times per day    amLODIPine  5 mg Oral Daily    calcitRIOL  0.25 mcg Oral Daily    carvedilol  25 mg Oral BID WC    gabapentin  300 mg Oral TID    latanoprost  1 drop Both Eyes Nightly    oxybutynin  5 mg Oral Daily    QUEtiapine  25 mg Oral Nightly    budesonide-formoterol  2 puff Inhalation BID    acyclovir  400 mg Oral BID    sodium chloride flush  10 mL Intravenous 2 times per day    enoxaparin  40 mg Subcutaneous Daily    furosemide  40 mg Intravenous BID    brimonidine  1 drop Both Eyes BID    And    timolol  1 drop Both Eyes BID      Infusions:   sodium chloride        PRN Meds:  naloxone, sodium chloride flush, acetaminophen, traMADol, sodium chloride flush, promethazine **OR** ondansetron, polyethylene glycol, [DISCONTINUED] acetaminophen **OR** acetaminophen       Physical Exam:  Vitals:    03/19/21 0855   BP: 122/62   Pulse: 80   Resp: 20   Temp:    SpO2: 92%        General: awake alert   Chest: Nontender  Cardiac: sinus   Lungs:Clear to auscultation and percussion. Abdomen: Soft, NT, ND, +BS  Extremities: 2+ edema   Vascular:  Equal 2+ peripheral pulses. Lab Data:  CBC:   Recent Labs     03/17/21  1643   WBC 5.4   HGB 15.7   HCT 51.8*   .4*        BMP:   Recent Labs     03/17/21  1643 03/18/21  0318 03/19/21  0434    144 148*   K 3.8 3.2* 3.9    105 104   CO2 24 30 35*   BUN 18 18 17   CREATININE 1.2* 1.2* 1.3*     LIVER PROFILE:   Recent Labs     03/17/21  1643   AST 19   ALT 11   BILITOT 1.1*   ALKPHOS 55     PT/INR:   Recent Labs     03/17/21  1643   PROTIME 12.0   INR 0.99     APTT:   Recent Labs     03/17/21  1643   APTT 32.1     BNP:  No results for input(s): BNP in the last 72 hours.       Assessment:  Patient Active Problem List    Diagnosis Date Noted    SOB (shortness of breath) 03/17/2021    Mild obstructive sleep apnea 03/08/2021    Moderate COPD (chronic obstructive pulmonary disease) ([facility] 03/08/2021    Overflow incontinence of urine 01/19/2021    Chronic respiratory failure ([facility] 01/04/2021    Moderate to severe pulmonary hypertension ([facility] 01/04/2021    Stage 3a chronic kidney disease 10/12/2020    Secondary hyperparathyroidism of renal origin ([facility] 10/12/2020    Morbid obesity with BMI of 45.0-49.9, adult ([facility] 09/17/2017    Primary insomnia 05/09/2016    Urge incontinence of urine 11/17/2015    Dry eye syndrome 11/17/2015    Glaucoma 11/17/2015    Essential hypertension 10/13/2015    Pedal edema 10/13/2015    Neuropathy of right lower extremity 10/13/2015    CKD (chronic kidney disease) 07/16/2015    Renal calculus, left        Shilpi Knapp MD 3/19/2021 9:57 AM

## 2021-03-19 NOTE — PROGRESS NOTES
PS Dr. Aquiles Tai:   3/19/21 4:17 PM   [address] [facility] [address] or Facility: [address] From: [place] RE: Claudio Melton RM: 2046 PT just started feeling sharp pain in right leg- shooting up the leg. Also having pressure in both legs. Her cath site looks good on her rt. groin. I can give tramadol for the pain. Just wanted to let you know Need Callback: NO CALLBACK REQ  Read 4:17 PM     Response:   3/19/21 4:20 PM   Ok to give tramadol but Lets make sure the pulse in rt leg is good. PS Dr. Aquiles Tai  3/19/21 6:33 PM   her pulse was good- she said it still hurts do you want US?   Read 6:33 PM   Response;   3/19/21 6:33 PM   No

## 2021-03-19 NOTE — PROGRESS NOTES
Hospitalist Progress Note      Name:  Christina Coronel /Age/Sex: 1940  (79 y.o. female)   MRN & CSN:  [id number] & [id number] Admission Date/Time: 3/17/2021  3:39 PM   Location:  [facility]- PCP: Berny Chirinos MD         Hospital Day: 3    Assessment and Plan:   Christina Coronel is a 79 y.o.  female  who presents with SOB.     -CHF exacerbation  -Acute hypoxic RF due to CHF  CXR showed pulmonary edema. RHC showed elevated LVEDP, and pulmonary hypertension. O2 reqt at 5 LPM today. Plan  Continue IV lasix    -Aortic stenosis - ORVILLE today.     -Elevated D dimer  - no DVT on US. -HTN: continue coreg, amlodipine  -CKD  -Neuropathy: gabapentin 300 mg TID  -Morbid obesity (BMI 43) - dietary and lifestyle modification advised. Diet DIET CARDIAC;   DVT Prophylaxis [x] Lovenox, []  Heparin, [] SCDs, [] Ambulation   GI Prophylaxis [] PPI,  [] H2 Blocker,  [] Carafate,  [] Diet/Tube Feeds   Code Status Full Code   Disposition Home   MDM [] Low, [x] Moderate,[]  High     History of Present Illness:   Patient seen and examined. No acute issues overnight. VS stable. LHC showed mild CAD; RHC showed severely elevated Rt heart pressures, LVEDP 30. Mean PA pressure 48 mmHg. She denies SOB, chest pain or palpitation. She is scheduled to have ORVILLE today. Ten point ROS reviewed negative, unless as noted above    Objective: Intake/Output Summary (Last 24 hours) at 3/19/2021 1137  Last data filed at 3/19/2021 0330  Gross per 24 hour   Intake 240 ml   Output 2050 ml   Net -1810 ml      Vitals:   Vitals:    21 1015   BP: 128/66   Pulse: 72   Resp: 17   Temp: 97.6 °F (36.4 °C)   SpO2: 92%     Physical Exam:   GEN Awake female, sitting upright in bed. RESP Clear to auscultation, no wheezes, rales or rhonchi. Symmetric chest movement while on room air. CARDIO/VASC S1/S2 auscultated. Regular rate, ejection systolic murmur present. Bilateral leg edema improving.      GI Abdomen is soft without significant tenderness, masses, or guarding. Bowel sounds are normoactive. MSK No gross joint deformities. SKIN Normal coloration, warm, dry. NEURO   normal speech, no lateralizing weakness.   PSYCH Awake, alert, oriented x 3    Medications:   Medications:    potassium chloride  20 mEq Oral BID    sodium chloride flush  10 mL Intravenous 2 times per day    amLODIPine  5 mg Oral Daily    calcitRIOL  0.25 mcg Oral Daily    carvedilol  25 mg Oral BID WC    gabapentin  300 mg Oral TID    latanoprost  1 drop Both Eyes Nightly    oxybutynin  5 mg Oral Daily    QUEtiapine  25 mg Oral Nightly    budesonide-formoterol  2 puff Inhalation BID    acyclovir  400 mg Oral BID    sodium chloride flush  10 mL Intravenous 2 times per day    enoxaparin  40 mg Subcutaneous Daily    furosemide  40 mg Intravenous BID    brimonidine  1 drop Both Eyes BID    And    timolol  1 drop Both Eyes BID      Infusions:   PRN Meds: naloxone, 0.4 mg, PRN  sodium chloride flush, 10 mL, PRN  acetaminophen, 650 mg, Q4H PRN  traMADol, 50 mg, Q6H PRN  sodium chloride flush, 10 mL, PRN  promethazine, 12.5 mg, Q6H PRN    Or  ondansetron, 4 mg, Q6H PRN  polyethylene glycol, 17 g, Daily PRN  acetaminophen, 650 mg, Q6H PRN        CBC   Recent Labs     03/17/21  1643   WBC 5.4   HGB 15.7   HCT 51.8*         BMP   Recent Labs     03/17/21  1643 03/18/21  0318 03/19/21  0434    144 148*   K 3.8 3.2* 3.9    105 104   CO2 24 30 35*   BUN 18 18 17   CREATININE 1.2* 1.2* 1.3*       Radiology report reviewed     Electronically signed by Marine Baeza MD on 3/19/2021 at 11:37 AM

## 2021-03-19 NOTE — CONSULTS
Subjective:   CHIEF COMPLAINT / HPI:  79year old female admitted with worsening sob and this is accompanied by wheeze. She has cough with some yellow sputum. She denies any fever or chest pain or hemoptysis. Past Medical History:  Past Medical History:   Diagnosis Date    Acute non Q wave MI (myocardial infarction), subsequent episode 7/15    probably demand ischemia. stress test was normal    Anemia     Arthritis     Diverticulitis     Dry eye syndrome 11/17/2015    Sees Dr Linn Cook.  On restasis    Glaucoma     Nghia filter in place 2007    History of blood transfusion 2008    Hx of blood clots 2007    \"Right Leg\"  [place] filter    HX OTHER MEDICAL 10-25-13 CT Chest With Contrast    Results Include Mildly Enlarged Mediastinal , Hilar Or Supraclavicular Lymph Nodes    Hypertension     Kidney stones     Obesity (BMI 35.0-39.9 without comorbidity)     Primary insomnia 5/9/2016    Patient takes Seroquel    Sepsis due to urinary tract infection ([address] 2015    transferred to [facility] Thyroid disease     \"Took Thyroid Medications Years Ago\"    Urinary incontinence     Ventral hernia     \"Have Ventral Hernia Now, Found On CT Scan \"       Past Surgical History:        Procedure Laterality Date    BREAST BIOPSY  Unsure When     Unsure Which Breast, Benign    BREAST SURGERY Right 1960    \"Infected Milk Gland\"    CHOLECYSTECTOMY  2001    Done During Gastric Bypass    COLONOSCOPY  2006    Diverticulitis    DENTAL SURGERY      Teeth Extracted In Past    FRACTURE SURGERY Right 2007    \"Right Femur Shattered\"    GASTRIC BYPASS SURGERY  2001    \"Lost About 125 Pounds, Gallbladder Also Taken Out\"    HERNIA REPAIR  2008    Abdominal Hernia Repair    HYSTERECTOMY, TOTAL ABDOMINAL  1994    JOINT REPLACEMENT Right 2007    Total Right Hip, \"Dr. Macario Bae Cut The Nerves During The Surgery\"    JOINT REPLACEMENT Left 5-08    Total Left Knee    JOINT REPLACEMENT Right 8-08    Total Right Knee  OTHER SURGICAL HISTORY  2007    [place] Filter    OTHER SURGICAL HISTORY  2-08    \"Hardware Removed Right Femur\"    OTHER SURGICAL HISTORY Left [id number]    left percutaneous nephrostomy placed, left ureteral stent lalced,     TONSILLECTOMY AND ADENOIDECTOMY  1940's       Current Medications:    Current Facility-Administered Medications: potassium chloride (KLOR-CON M) extended release tablet 20 mEq, 20 mEq, Oral, BID  0.9 % sodium chloride infusion, , Intravenous, Continuous  sodium chloride flush 0.9 % injection 10 mL, 10 mL, Intravenous, 2 times per day  sodium chloride flush 0.9 % injection 10 mL, 10 mL, Intravenous, PRN  acetaminophen (TYLENOL) tablet 650 mg, 650 mg, Oral, Q4H PRN  amLODIPine (NORVASC) tablet 5 mg, 5 mg, Oral, Daily  calcitRIOL (ROCALTROL) capsule 0.25 mcg, 0.25 mcg, Oral, Daily  carvedilol (COREG) tablet 25 mg, 25 mg, Oral, BID WC  gabapentin (NEURONTIN) capsule 300 mg, 300 mg, Oral, TID  latanoprost (XALATAN) 0.005 % ophthalmic solution 1 drop, 1 drop, Both Eyes, Nightly  oxybutynin (DITROPAN-XL) extended release tablet 5 mg, 5 mg, Oral, Daily  QUEtiapine (SEROQUEL) tablet 25 mg, 25 mg, Oral, Nightly  budesonide-formoterol (SYMBICORT) 160-4.5 MCG/ACT inhaler 2 puff, 2 puff, Inhalation, BID  traMADol (ULTRAM) tablet 50 mg, 50 mg, Oral, Q6H PRN  acyclovir (ZOVIRAX) capsule 400 mg, 400 mg, Oral, BID  sodium chloride flush 0.9 % injection 10 mL, 10 mL, Intravenous, 2 times per day  sodium chloride flush 0.9 % injection 10 mL, 10 mL, Intravenous, PRN  promethazine (PHENERGAN) tablet 12.5 mg, 12.5 mg, Oral, Q6H PRN **OR** ondansetron (ZOFRAN) injection 4 mg, 4 mg, Intravenous, Q6H PRN  polyethylene glycol (GLYCOLAX) packet 17 g, 17 g, Oral, Daily PRN  [DISCONTINUED] acetaminophen (TYLENOL) tablet 650 mg, 650 mg, Oral, Q6H PRN **OR** acetaminophen (TYLENOL) suppository 650 mg, 650 mg, Rectal, Q6H PRN  enoxaparin (LOVENOX) injection 40 mg, 40 mg, Subcutaneous, Daily  furosemide (LASIX) injection 40 mg, 40 mg, Intravenous, BID  brimonidine (ALPHAGAN) 0.2 % ophthalmic solution 1 drop, 1 drop, Both Eyes, BID **AND** timolol (TIMOPTIC) 0.5 % ophthalmic solution 1 drop, 1 drop, Both Eyes, BID    Allergies   Allergen Reactions    Diamox [Acetazolamide] Other (See Comments)     \"it almost killed me, I kept dropping things, I went to a wedding on a cruise ship and I couldn't remember anything\"       Social History:    Social History     Socioeconomic History    Marital status:       Spouse name: None    Number of children: None    Years of education: None    Highest education level: None   Occupational History    None   Social Needs    Financial resource strain: None    Food insecurity     Worry: None     Inability: None    Transportation needs     Medical: None     Non-medical: None   Tobacco Use    Smoking status: Former Smoker     Packs/day: 2.00     Years: 28.00     Pack years: 56.00     Start date: 11/15/1960     Quit date: 11/15/1988     Years since quittin.3    Smokeless tobacco: Never Used   Substance and Sexual Activity    Alcohol use: No     Alcohol/week: 0.0 standard drinks     Comment: \"Very Rarely\"    Drug use: No    Sexual activity: Not Currently     Partners: Male   Lifestyle    Physical activity     Days per week: None     Minutes per session: None    Stress: None   Relationships    Social connections     Talks on phone: None     Gets together: None     Attends Buddhism service: None     Active member of club or organization: None     Attends meetings of clubs or organizations: None     Relationship status: None    Intimate partner violence     Fear of current or ex partner: None     Emotionally abused: None     Physically abused: None     Forced sexual activity: None   Other Topics Concern    None   Social History Narrative    None       Family History:   Family History   Problem Relation Age of Onset    Arthritis Mother     High Blood Pressure Mother  Vision Loss Mother         \"Glaucoma\"    Diabetes Father     Vision Loss Father     High Blood Pressure Brother     High Cholesterol Brother     High Blood Pressure Brother     High Cholesterol Brother     High Blood Pressure Brother     High Cholesterol Brother     High Blood Pressure Brother     High Cholesterol Brother     Diabetes Brother     Other Brother     Vision Loss Brother         \"Glaucoma, Cataracts\"       Immunization:  Immunization History   Administered Date(s) Administered    Influenza Vaccine, unspecified formulation 10/20/2015    Influenza, High Dose (Fluzone 65 yrs and older) 10/26/2016, 09/14/2017    Pneumococcal Conjugate 13-valent ([id number]) 04/26/2016    Pneumococcal Polysaccharide ([id number]) 10/01/2011    Zoster Live (Zostavax) 01/01/2013         REVIEW OF SYSTEMS:    CONSTITUTIONAL:  negative for fevers, chills, diaphoresis, activity change, appetite change, fatigue, night sweats and unexpected weight change.    EYES:  negative for blurred vision, eye discharge, visual disturbance and icterus  HEENT:  negative for hearing loss, tinnitus, ear drainage, sinus pressure, nasal congestion, epistaxis and snoring  RESPIRATORY:  See HPI  CARDIOVASCULAR:  negative for chest pain, palpitations, exertional chest pressure/discomfort, edema, syncope  GASTROINTESTINAL:  negative for nausea, vomiting, diarrhea, constipation, blood in stool and abdominal pain  GENITOURINARY:  negative for frequency, dysuria and hematuria  HEMATOLOGIC/LYMPHATIC:  negative for easy bruising, bleeding and lymphadenopathy  ALLERGIC/IMMUNOLOGIC:  negative for recurrent infections, angioedema, anaphylaxis and drug reactions  ENDOCRINE:  negative for weight changes and diabetic symptoms including polyuria, polydipsia and polyphagia    MUSCULOSKELETAL:  negative for  pain, joint swelling, decreased range of motion and muscle weakness  NEUROLOGICAL:  negative for headaches, slurred speech, unilateral weakness  PSYCHIATRIC/BEHAVIORAL: negative for hallucinations, behavioral problems, confusion and agitation. Objective:   PHYSICAL EXAM:      VITALS:    Vitals:    03/18/21 1930 03/18/21 2154 03/18/21 2157 03/19/21 0331   BP:  (!) 141/79  130/76   Pulse:  85  64   Resp:  22  19   Temp:  97.9 °F (36.6 °C)  97.6 °F (36.4 °C)   TempSrc:  Oral  Oral   SpO2: 92% 90%  91%   Weight:   243 lb 14.4 oz (110.6 kg)    Height:             CONSTITUTIONAL:  awake, alert, cooperative, no apparent distress, and appears stated age  NECK:  Supple, symmetrical, trachea midline, no adenopathy, thyroid symmetric, not enlarged and no tenderness  CHEST: Chest expansion equal and symmetrical, no intercostal retraction. LUNGS:  no increased work of breathing, has expiratory wheezes both lungs, no crackles. CARDIOVASCULAR: S1 and S2, no edema and no JVD  ABDOMEN:  normal bowel sounds, non-distended and no masses palpated, and no tenderness to palpation. No hepatospleenomegaly  LYMPHADENOPATHY:  no axillary or supraclavicular adenopathy. No cervical adnenopathy  PSYCHIATRIC: Oriented to person place and time. No obvious depression or anxiety. MUSCULOSKELETAL: No obvious misalignment or effusion of the joints. No clubbing, cyanosis of the digits. RIGHT AND LEFT LOWER EXTREMITIES: No edema, no inflammation, no tenderness. SKIN:  normal skin color, texture, turgor and no redness, warmth, or swelling. No palpable nodules    DATA:       EXAMINATION:   CT OF THE CHEST WITHOUT CONTRAST 3/17/2021 8:34 pm       TECHNIQUE:   CT of the chest was performed without the administration of intravenous   contrast. Multiplanar reformatted images are provided for review.  Dose   modulation, iterative reconstruction, and/or weight based adjustment of the   mA/kV was utilized to reduce the radiation dose to as low as reasonably   achievable.       COMPARISON:   CT chest performed June 5, 2014.       HISTORY:   ORDERING SYSTEM PROVIDED HISTORY: dyspnea TECHNOLOGIST PROVIDED HISTORY:   Reason for exam:->dyspnea   Decision Support Exception->Emergency Medical Condition (MA)   Reason for Exam: sob/congestion       FINDINGS:   Mediastinum: The esophagus is unremarkable.  No significant mediastinal or   hilar lymphadenopathy.  No pericardial effusion.  Multivessel coronary artery   disease.  There is dilation of the ascending thoracic aorta at the level of   the pulmonary trunk measuring up to 4.1 cm in diameter.  Diffuse   atherosclerotic disease involving the thoracic aorta.  There is dilation of   the pulmonary trunk measuring up to 4.0 cm in diameter.       Lungs/pleura: There is moderate to severe emphysematous changes noted.  Mild   interlobular septal thickening.  No focal airspace consolidation, pleural   effusion, or pneumothorax.  The central airways are grossly patent.       Upper Abdomen: No acute abnormality of the visualized upper abdomen.       Soft Tissues/Bones: Multiple enlarged right supraclavicular and cervical   lymph nodes.  For example on axial image 8, a right cervical lymph node   measures 2.3 x 1.4 cm.  No significant right cervical/supraclavicular lymph   nodes or significant bilateral axillary lymphadenopathy.           Impression   1. Moderate to severe emphysematous changes. 2. Dilation of the pulmonary trunk which could indicate pulmonary   hypertension.  Mild interlobular septal thickening could indicate   interstitial edema. 3. Dilation of the ascending thoracic aorta measuring up to 4.1 cm in   diameter. 4. Multiple enlarged right supraclavicular and cervical lymph nodes.           Order History    Open Order Details   PACS Images     Show images for CT CHEST WO CONTRAST   Results History Report    View Report             abg 7.40/57/56            Assessment:     1. Ac on ch hypoxemic and hypercapneic resp failure  2. Ac copd  3. chf  4. Mod pulm htn  5. Mild maine          Plan:     1. D/w pt  2.  Told her that she needs to use pap rx, its benefits and dangers of not using it. She promises to use it tonite  3. Adequate o2 adm  4. autopap at nite  5. Bd rx  6.  Thanks will follow

## 2021-03-19 NOTE — PROGRESS NOTES
Pt placed on cpap with pressure at the lowest setting of 5cm and pt states she can't tolerate it and wants it taken off

## 2021-03-20 LAB
ANION GAP SERPL CALCULATED.3IONS-SCNC: 6 MMOL/L (ref 4–16)
BUN BLDV-MCNC: 20 MG/DL (ref 6–23)
CALCIUM SERPL-MCNC: 8.6 MG/DL (ref 8.3–10.6)
CHLORIDE BLD-SCNC: 99 MMOL/L (ref 99–110)
CO2: 36 MMOL/L (ref 21–32)
CREAT SERPL-MCNC: 1.4 MG/DL (ref 0.6–1.1)
GFR AFRICAN AMERICAN: 44 ML/MIN/1.73M2
GFR NON-AFRICAN AMERICAN: 36 ML/MIN/1.73M2
GLUCOSE BLD-MCNC: 84 MG/DL (ref 70–99)
MAGNESIUM: 1.9 MG/DL (ref 1.8–2.4)
POTASSIUM SERPL-SCNC: 3.6 MMOL/L (ref 3.5–5.1)
PRO-BNP: 4855 PG/ML
SODIUM BLD-SCNC: 141 MMOL/L (ref 135–145)

## 2021-03-20 PROCEDURE — 80048 BASIC METABOLIC PNL TOTAL CA: CPT

## 2021-03-20 PROCEDURE — 6370000000 HC RX 637 (ALT 250 FOR IP): Performed by: INTERNAL MEDICINE

## 2021-03-20 PROCEDURE — 2700000000 HC OXYGEN THERAPY PER DAY

## 2021-03-20 PROCEDURE — 94761 N-INVAS EAR/PLS OXIMETRY MLT: CPT

## 2021-03-20 PROCEDURE — 94640 AIRWAY INHALATION TREATMENT: CPT

## 2021-03-20 PROCEDURE — 36415 COLL VENOUS BLD VENIPUNCTURE: CPT

## 2021-03-20 PROCEDURE — 6360000002 HC RX W HCPCS: Performed by: INTERNAL MEDICINE

## 2021-03-20 PROCEDURE — 93005 ELECTROCARDIOGRAM TRACING: CPT | Performed by: PHYSICIAN ASSISTANT

## 2021-03-20 PROCEDURE — 1200000000 HC SEMI PRIVATE

## 2021-03-20 PROCEDURE — 83880 ASSAY OF NATRIURETIC PEPTIDE: CPT

## 2021-03-20 PROCEDURE — 93005 ELECTROCARDIOGRAM TRACING: CPT | Performed by: INTERNAL MEDICINE

## 2021-03-20 PROCEDURE — 83735 ASSAY OF MAGNESIUM: CPT

## 2021-03-20 PROCEDURE — 2580000003 HC RX 258: Performed by: INTERNAL MEDICINE

## 2021-03-20 RX ORDER — LISINOPRIL 5 MG/1
5 TABLET ORAL DAILY
Status: DISCONTINUED | OUTPATIENT
Start: 2021-03-20 | End: 2021-03-24 | Stop reason: HOSPADM

## 2021-03-20 RX ORDER — SILDENAFIL CITRATE 20 MG/1
10 TABLET ORAL 2 TIMES DAILY
Status: DISCONTINUED | OUTPATIENT
Start: 2021-03-20 | End: 2021-03-24 | Stop reason: HOSPADM

## 2021-03-20 RX ORDER — TORSEMIDE 20 MG/1
20 TABLET ORAL DAILY
Status: DISCONTINUED | OUTPATIENT
Start: 2021-03-20 | End: 2021-03-24 | Stop reason: HOSPADM

## 2021-03-20 RX ORDER — CARVEDILOL 12.5 MG/1
12.5 TABLET ORAL 2 TIMES DAILY WITH MEALS
Status: DISCONTINUED | OUTPATIENT
Start: 2021-03-20 | End: 2021-03-24 | Stop reason: HOSPADM

## 2021-03-20 RX ADMIN — POTASSIUM CHLORIDE 20 MEQ: 1500 TABLET, EXTENDED RELEASE ORAL at 20:36

## 2021-03-20 RX ADMIN — TIMOLOL MALEATE 1 DROP: 5 SOLUTION OPHTHALMIC at 07:55

## 2021-03-20 RX ADMIN — POTASSIUM CHLORIDE 20 MEQ: 1500 TABLET, EXTENDED RELEASE ORAL at 07:52

## 2021-03-20 RX ADMIN — LATANOPROST 1 DROP: 50 SOLUTION/ DROPS OPHTHALMIC at 20:37

## 2021-03-20 RX ADMIN — BRIMONIDINE TARTRATE 1 DROP: 2 SOLUTION OPHTHALMIC at 07:55

## 2021-03-20 RX ADMIN — SODIUM CHLORIDE, PRESERVATIVE FREE 10 ML: 5 INJECTION INTRAVENOUS at 20:56

## 2021-03-20 RX ADMIN — CALCITRIOL CAPSULES 0.25 MCG 0.25 MCG: 0.25 CAPSULE ORAL at 07:53

## 2021-03-20 RX ADMIN — SILDENAFIL CITRATE 10 MG: 20 TABLET ORAL at 20:36

## 2021-03-20 RX ADMIN — SODIUM CHLORIDE, PRESERVATIVE FREE 10 ML: 5 INJECTION INTRAVENOUS at 20:36

## 2021-03-20 RX ADMIN — TIMOLOL MALEATE 1 DROP: 5 SOLUTION OPHTHALMIC at 20:37

## 2021-03-20 RX ADMIN — SILDENAFIL CITRATE 10 MG: 20 TABLET ORAL at 13:15

## 2021-03-20 RX ADMIN — ACYCLOVIR 400 MG: 200 CAPSULE ORAL at 07:52

## 2021-03-20 RX ADMIN — BUDESONIDE AND FORMOTEROL FUMARATE DIHYDRATE 2 PUFF: 160; 4.5 AEROSOL RESPIRATORY (INHALATION) at 19:34

## 2021-03-20 RX ADMIN — ACETAMINOPHEN 650 MG: 325 TABLET ORAL at 20:37

## 2021-03-20 RX ADMIN — BRIMONIDINE TARTRATE 1 DROP: 2 SOLUTION OPHTHALMIC at 20:37

## 2021-03-20 RX ADMIN — CARVEDILOL 12.5 MG: 12.5 TABLET, FILM COATED ORAL at 17:56

## 2021-03-20 RX ADMIN — QUETIAPINE FUMARATE 25 MG: 25 TABLET ORAL at 22:28

## 2021-03-20 RX ADMIN — AMLODIPINE BESYLATE 5 MG: 5 TABLET ORAL at 07:52

## 2021-03-20 RX ADMIN — LISINOPRIL 5 MG: 5 TABLET ORAL at 13:15

## 2021-03-20 RX ADMIN — GABAPENTIN 300 MG: 300 CAPSULE ORAL at 07:53

## 2021-03-20 RX ADMIN — FUROSEMIDE 40 MG: 10 INJECTION, SOLUTION INTRAMUSCULAR; INTRAVENOUS at 07:53

## 2021-03-20 RX ADMIN — SODIUM CHLORIDE, PRESERVATIVE FREE 10 ML: 5 INJECTION INTRAVENOUS at 07:54

## 2021-03-20 RX ADMIN — GABAPENTIN 300 MG: 300 CAPSULE ORAL at 13:37

## 2021-03-20 RX ADMIN — ENOXAPARIN SODIUM 40 MG: 40 INJECTION SUBCUTANEOUS at 07:54

## 2021-03-20 RX ADMIN — BUDESONIDE AND FORMOTEROL FUMARATE DIHYDRATE 2 PUFF: 160; 4.5 AEROSOL RESPIRATORY (INHALATION) at 08:17

## 2021-03-20 RX ADMIN — TORSEMIDE 20 MG: 20 TABLET ORAL at 13:15

## 2021-03-20 RX ADMIN — CARVEDILOL 25 MG: 25 TABLET, FILM COATED ORAL at 07:53

## 2021-03-20 RX ADMIN — GABAPENTIN 300 MG: 300 CAPSULE ORAL at 22:28

## 2021-03-20 RX ADMIN — ACYCLOVIR 400 MG: 200 CAPSULE ORAL at 20:35

## 2021-03-20 RX ADMIN — ACETAMINOPHEN 650 MG: 325 TABLET ORAL at 07:53

## 2021-03-20 ASSESSMENT — PAIN SCALES - GENERAL: PAINLEVEL_OUTOF10: 3

## 2021-03-20 ASSESSMENT — PAIN DESCRIPTION - PAIN TYPE: TYPE: ACUTE PAIN

## 2021-03-20 ASSESSMENT — PAIN DESCRIPTION - ONSET: ONSET: SUDDEN

## 2021-03-20 ASSESSMENT — PAIN DESCRIPTION - LOCATION: LOCATION: HEAD

## 2021-03-20 ASSESSMENT — PAIN DESCRIPTION - DESCRIPTORS: DESCRIPTORS: HEADACHE

## 2021-03-20 NOTE — PROGRESS NOTES
MARY Lucio PA:    3/20/21 1:26 PM   Pt bp 111/67 (80) she has sildenafil 10mg, torsemide 20mg, and lisinpril 5mg- would you like me to give all? Read 1:26 PM   Response:   3/20/21 1:28 PM   Give everything except for the sildenafil for right now. Then I would check back in one hour and if blood pressure is still above 554 systolic then give sildenafil as well.

## 2021-03-20 NOTE — PROGRESS NOTES
Pt did not tolerate autopap and wore it for less than an hour. Even on 5L NC pt continues to have difficulty maintaining SpO2 greater than 88% when sleeping. Pt awake at this time and SpO2 does not go above 89%, pleth is good. O2 had to be increased to 6L at this time to achieve a SpO2 of 90% or greater, pt typically tolerates less O2 when awake. Pt SpO2 did not exceed 93% through the course of the night while on 5L and typically dropped to 83%-89%, though these were typically not sustained for more than a minute or so.

## 2021-03-20 NOTE — PROGRESS NOTES
Hospitalist Progress Note      Name:  Zaida Castaneda /Age/Sex: 1940  (79 y.o. female)   MRN & CSN:  [id number] & [id number] Admission Date/Time: 3/17/2021  3:39 PM   [facility]  [facility] PCP: Day Perrin MD         Hospital Day: 4    Assessment and Plan:   Zaida Castaneda is a 79 y.o.  female  who presents with SOB. -Acute on chronic diastolic CHF   -Acute hypoxic RF due to CHF  -Pulmomary HTN  CXR showed pulmonary edema. RHC showed elevated LVEDP, and pulmonary hypertension. O2 reqt remains at 5-6 LPM.  Plan  Continue IV lasix. Await cardiology recs. -Moderate Aortic stenosis on [place]  -Elevated D dimer  - no DVT on US. VQ scan low probability for PE. -HTN: continue coreg, amlodipine  -CKD3 - Cr stable.   -Neuropathy: gabapentin 300 mg TID  -Morbid obesity (BMI 43) - dietary and lifestyle modification advised. Diet DIET CARDIAC; Lactose Controlled   DVT Prophylaxis [x] Lovenox, []  Heparin, [] SCDs, [] Ambulation   GI Prophylaxis [] PPI,  [] H2 Blocker,  [] Carafate,  [] Diet/Tube Feeds   Code Status Full Code   Disposition Home when okay by cardiology. MDM [] Low, [x] Moderate,[]  High     History of Present Illness:   Patient seen and examined. No acute issues overnight. VS stable. She did not tolerate CPAP last night. Ten point ROS reviewed negative, unless as noted above    Objective: Intake/Output Summary (Last 24 hours) at 3/20/2021 1100  Last data filed at 3/20/2021 0406  Gross per 24 hour   Intake 360 ml   Output 1650 ml   Net -1290 ml      Vitals:   Vitals:    21 0818   BP:    Pulse:    Resp: 20   Temp:    SpO2: 91%     Physical Exam:   GEN Awake female, sitting upright in bed. RESP Clear to auscultation, no wheezes, rales or rhonchi. Symmetric chest movement while on room air. CARDIO/VASC S1/S2 auscultated. Regular rate, ejection systolic murmur present. No edema. GI Abdomen is soft without significant tenderness, masses, or guarding.  Bowel sounds are normoactive. MSK No gross joint deformities. SKIN Normal coloration, warm, dry. NEURO   normal speech, no lateralizing weakness.   PSYCH Awake, alert, oriented x 3    Medications:   Medications:    potassium chloride  20 mEq Oral BID    sodium chloride flush  10 mL Intravenous 2 times per day    amLODIPine  5 mg Oral Daily    calcitRIOL  0.25 mcg Oral Daily    carvedilol  25 mg Oral BID WC    gabapentin  300 mg Oral TID    latanoprost  1 drop Both Eyes Nightly    oxybutynin  5 mg Oral Daily    QUEtiapine  25 mg Oral Nightly    budesonide-formoterol  2 puff Inhalation BID    acyclovir  400 mg Oral BID    sodium chloride flush  10 mL Intravenous 2 times per day    enoxaparin  40 mg Subcutaneous Daily    furosemide  40 mg Intravenous BID    brimonidine  1 drop Both Eyes BID    And    timolol  1 drop Both Eyes BID      Infusions:   PRN Meds: naloxone, 0.4 mg, PRN  sodium chloride flush, 10 mL, PRN  acetaminophen, 650 mg, Q4H PRN  traMADol, 50 mg, Q6H PRN  sodium chloride flush, 10 mL, PRN  promethazine, 12.5 mg, Q6H PRN    Or  ondansetron, 4 mg, Q6H PRN  polyethylene glycol, 17 g, Daily PRN  acetaminophen, 650 mg, Q6H PRN        CBC   Recent Labs     03/17/21  1643   WBC 5.4   HGB 15.7   HCT 51.8*         BMP   Recent Labs     03/18/21  0318 03/19/21  0434 03/20/21  0524    148* 141   K 3.2* 3.9 3.6    104 99   CO2 30 35* 36*   BUN 18 17 20   CREATININE 1.2* 1.3* 1.4*       Radiology report reviewed     Electronically signed by Ross De La Paz MD on 3/20/2021 at 11:00 AM

## 2021-03-20 NOTE — PROGRESS NOTES
Daily Progress Note      Biventricular dysfunction with cor-pulmonale and moderate AS -continue diuretics  Needed to be complaint with CPAP  Increase activity   Will stop Norvasc due to leg swelling  Will try Revatio for pul HTN and change to Demadex low dose ACEI  Bradycardia, check holter and lower dose of coreg    Pt. Awake, alert and feeling ok  HR stable, BP stable  No CP, still with SOB this am, some LE edema     Acute on Chronic HFpEF    Worsening SOB and LE edema noted    Has been on O2 the last 2-3 weeks    Elevated BNP- down trending now    Trop neg. CT chest W/O non-acute    No DVT noted-VQ neg. ORV[place]LE shows mod. AS    LHC neg. RHC shows severe PAH    COPD/ PAH    pulm following    Likely related to COPD and YAJAIRA    Using CPAP- need compliance    Per pulm    Possibly intermittent CHB? HR stable in the 50s and she is asymptomatic- continue to monitor-will attempt to order Holter- if cannot will do OP.   Will follow    [place]-3/19/21  IMPRESSION:  1. No clot or thrombus noted in the left atrium, left atrial appendage,  LV cavity. 2.  Moderate mitral annular calcification noted with mild mitral  regurgitation present. 3.  EF is around 50% range. 4.  No pericardial effusion noted. 5.  No ASD or PFO was noted. 6.  Tricuspid valve and pulmonic valve are grossly normal.  7.  Moderate aortic stenosis noted. Aortic valve is sclerotic with  trace aortic regurgitation present.     LHC/RHC-3/18/21  LEFT MAIN PATENT  LAD MILD DX  LCX MILD DX  RCA MILD DX  LVEDP 30  SEVERE ELEVATED RIGHT HEART PRESSURES  CHECK ORVILLE FOR MR AND HAS MODERATE AS  DIURESIS FOR NOW  RIGHT GROIN   NO COMPLICATIONS      PAST MEDICAL HISTORY:  History of having aortic stenosis present,  history of hypertension, history of having questionable COPD present,  arthritis, she had [place] filter placement in the past for DVT.  She  had kidney stones and she had I think renal failure and she was on _____  in the remote past also.  This was in 2015, but she has recovered well.     PAST SURGICAL HISTORY:  [place] filter placement, gallbladder  surgery, and hysterectomy done.     SOCIAL HISTORY:  She does not smoke, does not drink.     ALLERGIES:  DIAMOX.     MEDICATIONS AT HOME:  She is on Glucotrol, Ditropan, Ultram, fish oil,  Neurontin, Seroquel, Lasix, and Coreg. Objective:   BP (!) 144/69   Pulse 72   Temp 98.1 °F (36.7 °C) (Oral)   Resp 20   Ht 5' 4.02\" (1.626 m)   Wt 248 lb 12.8 oz (112.9 kg)   SpO2 91%   BMI 42.69 kg/m²       Intake/Output Summary (Last 24 hours) at 3/20/2021 1203  Last data filed at 3/20/2021 0406  Gross per 24 hour   Intake 360 ml   Output 1650 ml   Net -1290 ml       Medications:   Scheduled Meds:   potassium chloride  20 mEq Oral BID    sodium chloride flush  10 mL Intravenous 2 times per day    amLODIPine  5 mg Oral Daily    calcitRIOL  0.25 mcg Oral Daily    carvedilol  25 mg Oral BID WC    gabapentin  300 mg Oral TID    latanoprost  1 drop Both Eyes Nightly    oxybutynin  5 mg Oral Daily    QUEtiapine  25 mg Oral Nightly    budesonide-formoterol  2 puff Inhalation BID    acyclovir  400 mg Oral BID    sodium chloride flush  10 mL Intravenous 2 times per day    enoxaparin  40 mg Subcutaneous Daily    furosemide  40 mg Intravenous BID    brimonidine  1 drop Both Eyes BID    And    timolol  1 drop Both Eyes BID      Infusions:     PRN Meds:  naloxone, sodium chloride flush, acetaminophen, traMADol, sodium chloride flush, promethazine **OR** ondansetron, polyethylene glycol, [DISCONTINUED] acetaminophen **OR** acetaminophen       Physical Exam:  Vitals:    03/20/21 0818   BP:    Pulse:    Resp: 20   Temp:    SpO2: 91%        General: AAO, NAD  Chest: Nontender  Cardiac: First and Second Heart Sounds are Normal, No Murmurs or Gallops noted  Lungs:Clear to auscultation and percussion.   Abdomen: Soft, NT, ND, +BS  Extremities: No clubbing, no edema  Vascular:  Equal 2+ peripheral pulses. Lab Data:  CBC:   Recent Labs     03/17/21  1643   WBC 5.4   HGB 15.7   HCT 51.8*   .4*        BMP:   Recent Labs     03/18/21  0318 03/19/21  0434 03/20/21  0524    148* 141   K 3.2* 3.9 3.6    104 99   CO2 30 35* 36*   BUN 18 17 20   CREATININE 1.2* 1.3* 1.4*     LIVER PROFILE:   Recent Labs     03/17/21  1643   AST 19   ALT 11   BILITOT 1.1*   ALKPHOS 55     PT/INR:   Recent Labs     03/17/21  1643   PROTIME 12.0   INR 0.99     APTT:   Recent Labs     03/17/21  1643   APTT 32.1     BNP:  No results for input(s): BNP in the last 72 hours.       Assessment:  Patient Active Problem List    Diagnosis Date Noted    SOB (shortness of breath) 03/17/2021    Mild obstructive sleep apnea 03/08/2021    Moderate COPD (chronic obstructive pulmonary disease) ([facility] [address] 03/08/2021    Overflow incontinence of urine 01/19/2021    Chronic respiratory failure ([facility] [address] [facility] 01/04/2021    Moderate to severe pulmonary hypertension ([facility] [address] 01/04/2021    Stage 3a chronic kidney disease 10/12/2020    Secondary hyperparathyroidism of renal origin ([facility] [address] 10/12/2020    Morbid obesity with BMI of 45.0-49.9, adult ([facility] [address] 09/17/2017    Primary insomnia 05/09/2016    Urge incontinence of urine 11/17/2015    Dry eye syndrome 11/17/2015    Glaucoma 11/17/2015    Essential hypertension 10/13/2015    Pedal edema 10/13/2015    Neuropathy of right lower extremity 10/13/2015    CKD (chronic kidney disease) 07/16/2015    Renal calculus, left        Electronically signed by Ad Velazco PA-C on 3/20/2021 at 12:03 PM   Electronically signed by Omar Rm MD on 3/20/21 at 12:20 PM EDT

## 2021-03-20 NOTE — PROGRESS NOTES
pulmonary      SUBJECTIVE: she feels better     OBJECTIVE    VITALS:  BP (!) 144/69   Pulse 72   Temp 98.1 °F (36.7 °C) (Oral)   Resp 20   Ht 5' 4.02\" (1.626 m)   Wt 248 lb 12.8 oz (112.9 kg)   SpO2 91%   BMI 42.69 kg/m²   HEAD AND FACE EXAM:  No throat injection, no active exudate,no thrush  NECK EXAM;No JVD, no masses, symmetrical  CHEST EXAM; Expansion equal and symmetrical, no masses  LUNG EXAM; Good breath sounds bilaterally. There are expiratory wheezes both lungs, there are crackles at both lung bases  CARDIOVASCULAR EXAM: Positive S1 and S2, no S3 or S4, no clicks ,no murmurs  RIGHT AND LEFT LOWER EXTRIMITY EXAM: No edema, no swelling, no inflamation  CNS EXAM: Alert and oriented X3          LABS   Lab Results   Component Value Date    WBC 5.4 03/17/2021    HGB 15.7 03/17/2021    HCT 51.8 (H) 03/17/2021    .4 (H) 03/17/2021     03/17/2021     Lab Results   Component Value Date    CREATININE 1.4 (H) 03/20/2021    BUN 20 03/20/2021     03/20/2021    K 3.6 03/20/2021    CL 99 03/20/2021    CO2 36 (H) 03/20/2021     Lab Results   Component Value Date    INR 0.99 03/17/2021    PROTIME 12.0 03/17/2021          Lab Results   Component Value Date    PHOS 3.9 01/06/2021    PHOS 3.2 05/31/2019    PHOS 3.9 10/09/2018        Recent Labs     03/18/21  1330   PH 7.40   PO2ART 56*   [id number] 57.0*   O2SAT 87.7*         Wt Readings from Last 3 Encounters:   03/20/21 248 lb 12.8 oz (112.9 kg)   03/08/21 250 lb (113.4 kg)   01/21/21 250 lb (113.4 kg)               ASSESMENT  Ac on ch resp failure  Ac copd  chf  m[place]        PLAN  1. cpm  2. Cont o2  3.  As per pt used pap last night    3/20/2021  Clance Dakin, M.D.

## 2021-03-21 LAB
ANION GAP SERPL CALCULATED.3IONS-SCNC: 7 MMOL/L (ref 4–16)
BUN BLDV-MCNC: 28 MG/DL (ref 6–23)
CALCIUM SERPL-MCNC: 8.9 MG/DL (ref 8.3–10.6)
CHLORIDE BLD-SCNC: 100 MMOL/L (ref 99–110)
CO2: 39 MMOL/L (ref 21–32)
CREAT SERPL-MCNC: 1.4 MG/DL (ref 0.6–1.1)
GFR AFRICAN AMERICAN: 44 ML/MIN/1.73M2
GFR NON-AFRICAN AMERICAN: 36 ML/MIN/1.73M2
GLUCOSE BLD-MCNC: 82 MG/DL (ref 70–99)
MAGNESIUM: 2 MG/DL (ref 1.8–2.4)
POTASSIUM SERPL-SCNC: 3.9 MMOL/L (ref 3.5–5.1)
SODIUM BLD-SCNC: 146 MMOL/L (ref 135–145)

## 2021-03-21 PROCEDURE — 6370000000 HC RX 637 (ALT 250 FOR IP): Performed by: INTERNAL MEDICINE

## 2021-03-21 PROCEDURE — 2580000003 HC RX 258: Performed by: INTERNAL MEDICINE

## 2021-03-21 PROCEDURE — 83735 ASSAY OF MAGNESIUM: CPT

## 2021-03-21 PROCEDURE — 1200000000 HC SEMI PRIVATE

## 2021-03-21 PROCEDURE — 80048 BASIC METABOLIC PNL TOTAL CA: CPT

## 2021-03-21 PROCEDURE — 94640 AIRWAY INHALATION TREATMENT: CPT

## 2021-03-21 PROCEDURE — 6360000002 HC RX W HCPCS: Performed by: INTERNAL MEDICINE

## 2021-03-21 PROCEDURE — 36415 COLL VENOUS BLD VENIPUNCTURE: CPT

## 2021-03-21 PROCEDURE — 2700000000 HC OXYGEN THERAPY PER DAY

## 2021-03-21 PROCEDURE — 94761 N-INVAS EAR/PLS OXIMETRY MLT: CPT

## 2021-03-21 RX ORDER — HYDROCODONE BITARTRATE AND ACETAMINOPHEN 7.5; 325 MG/1; MG/1
1 TABLET ORAL EVERY 6 HOURS PRN
Status: DISCONTINUED | OUTPATIENT
Start: 2021-03-21 | End: 2021-03-24 | Stop reason: HOSPADM

## 2021-03-21 RX ADMIN — ACYCLOVIR 400 MG: 200 CAPSULE ORAL at 09:49

## 2021-03-21 RX ADMIN — ACYCLOVIR 400 MG: 200 CAPSULE ORAL at 20:57

## 2021-03-21 RX ADMIN — HYDROCODONE BITARTRATE AND ACETAMINOPHEN 1 TABLET: 7.5; 325 TABLET ORAL at 09:50

## 2021-03-21 RX ADMIN — BRIMONIDINE TARTRATE 1 DROP: 2 SOLUTION OPHTHALMIC at 09:52

## 2021-03-21 RX ADMIN — SODIUM CHLORIDE, PRESERVATIVE FREE 10 ML: 5 INJECTION INTRAVENOUS at 20:57

## 2021-03-21 RX ADMIN — GABAPENTIN 300 MG: 300 CAPSULE ORAL at 20:57

## 2021-03-21 RX ADMIN — TORSEMIDE 20 MG: 20 TABLET ORAL at 09:51

## 2021-03-21 RX ADMIN — BUDESONIDE AND FORMOTEROL FUMARATE DIHYDRATE 2 PUFF: 160; 4.5 AEROSOL RESPIRATORY (INHALATION) at 08:08

## 2021-03-21 RX ADMIN — ENOXAPARIN SODIUM 40 MG: 40 INJECTION SUBCUTANEOUS at 09:52

## 2021-03-21 RX ADMIN — HYDROCODONE BITARTRATE AND ACETAMINOPHEN 1 TABLET: 7.5; 325 TABLET ORAL at 17:39

## 2021-03-21 RX ADMIN — POTASSIUM CHLORIDE 20 MEQ: 1500 TABLET, EXTENDED RELEASE ORAL at 20:57

## 2021-03-21 RX ADMIN — CALCITRIOL CAPSULES 0.25 MCG 0.25 MCG: 0.25 CAPSULE ORAL at 09:51

## 2021-03-21 RX ADMIN — SILDENAFIL CITRATE 10 MG: 20 TABLET ORAL at 09:50

## 2021-03-21 RX ADMIN — GABAPENTIN 300 MG: 300 CAPSULE ORAL at 15:37

## 2021-03-21 RX ADMIN — QUETIAPINE FUMARATE 25 MG: 25 TABLET ORAL at 20:57

## 2021-03-21 RX ADMIN — GABAPENTIN 300 MG: 300 CAPSULE ORAL at 09:50

## 2021-03-21 RX ADMIN — POTASSIUM CHLORIDE 20 MEQ: 1500 TABLET, EXTENDED RELEASE ORAL at 09:51

## 2021-03-21 RX ADMIN — LATANOPROST 1 DROP: 50 SOLUTION/ DROPS OPHTHALMIC at 20:57

## 2021-03-21 RX ADMIN — TIMOLOL MALEATE 1 DROP: 5 SOLUTION OPHTHALMIC at 09:52

## 2021-03-21 RX ADMIN — SODIUM CHLORIDE, PRESERVATIVE FREE 10 ML: 5 INJECTION INTRAVENOUS at 10:01

## 2021-03-21 RX ADMIN — BUDESONIDE AND FORMOTEROL FUMARATE DIHYDRATE 2 PUFF: 160; 4.5 AEROSOL RESPIRATORY (INHALATION) at 19:15

## 2021-03-21 RX ADMIN — SODIUM CHLORIDE, PRESERVATIVE FREE 10 ML: 5 INJECTION INTRAVENOUS at 09:53

## 2021-03-21 RX ADMIN — SILDENAFIL CITRATE 10 MG: 20 TABLET ORAL at 20:57

## 2021-03-21 RX ADMIN — CARVEDILOL 12.5 MG: 12.5 TABLET, FILM COATED ORAL at 16:24

## 2021-03-21 RX ADMIN — CARVEDILOL 12.5 MG: 12.5 TABLET, FILM COATED ORAL at 09:55

## 2021-03-21 RX ADMIN — BRIMONIDINE TARTRATE 1 DROP: 2 SOLUTION OPHTHALMIC at 20:57

## 2021-03-21 RX ADMIN — LISINOPRIL 5 MG: 5 TABLET ORAL at 09:49

## 2021-03-21 RX ADMIN — TIMOLOL MALEATE 1 DROP: 5 SOLUTION OPHTHALMIC at 20:57

## 2021-03-21 ASSESSMENT — PAIN DESCRIPTION - ORIENTATION: ORIENTATION: RIGHT;LEFT

## 2021-03-21 ASSESSMENT — PAIN DESCRIPTION - FREQUENCY: FREQUENCY: CONTINUOUS

## 2021-03-21 ASSESSMENT — PAIN DESCRIPTION - DESCRIPTORS: DESCRIPTORS: PRESSURE;SHOOTING

## 2021-03-21 NOTE — PROGRESS NOTES
03/20/21 8492   NIV Type   NIV Started/Stopped On   Equipment Type resmed   Mode AVAPS   Mask Type Full face mask   Settings/Measurements   Resp 21   O2 Flow Rate (L/min) 6 L/min   SpO2 96

## 2021-03-21 NOTE — PROGRESS NOTES
03/21/21 0128   NIV Type   NIV Started/Stopped Off   Equipment Type resmed  in standby   Mode Auto-PAP   Mask Type Full face mask   Settings/Measurements   Resp 19   O2 Flow Rate (L/min) 6 L/min   SpO2 91

## 2021-03-21 NOTE — PROGRESS NOTES
Hospitalist Progress Note      Name:  José Miguel Goetz /Age/Sex: 1940  (79 y.o. female)   MRN & CSN:  [id number] & [id number] Admission Date/Time: 3/17/2021  3:39 PM   Location:  -A PCP: Ruth Lawson MD         Hospital Day: 5    Assessment and Plan:   José Miguel Gotez is a 79 y.o.  female  who presents with SOB. -Acute on chronic diastolic CHF   -Acute hypoxic RF due to CHF  -Pulmomary HTN  RHC showed elevated LVEDP, and pulmonary hypertension. O2 reqt remains at 6 LPM.  IV lasix switched to PO torsemide 20 mg daily starting today. -COPD:continue symbicort inhaler. CPAP at night.   -Moderate Aortic stenosis on ORVILLE  -Elevated D dimer  - no DVT on US. VQ scan low probability for PE. -HTN: continue coreg, amlodipine  -CKD3 - Cr stable.   -Neuropathy: gabapentin 300 mg TID  -Morbid obesity (BMI 43) - dietary and lifestyle modification advised. Diet DIET CARDIAC; Lactose Controlled   DVT Prophylaxis [x] Lovenox, []  Heparin, [] SCDs, [] Ambulation   GI Prophylaxis [] PPI,  [] H2 Blocker,  [] Carafate,  [] Diet/Tube Feeds   Code Status Full Code   Disposition Pending PT/OT eval. Likely to ECF. Medically stable for dc. MDM [] Low, [x] Moderate,[]  High     History of Present Illness:   Patient seen and examined. No acute issues overnight. VS stable. She is worried she will not be able to care for herself is she returns home. She is open to going to ECF. Ten point ROS reviewed negative, unless as noted above    Objective: Intake/Output Summary (Last 24 hours) at 3/21/2021 1100  Last data filed at 3/21/2021 0347  Gross per 24 hour   Intake --   Output 2300 ml   Net -2300 ml      Vitals:   Vitals:    21 0945   BP: (!) 140/73   Pulse: 75   Resp: 17   Temp: 97.7 °F (36.5 °C)   SpO2: 93%     Physical Exam:   GEN Awake female, sitting upright in bed. RESP Comfortable on 7 LPM oxygen via NC.  CARDIO/VASC S1/S2 auscultated.  Regular rate, ejection systolic murmur present. No edema. GI Abdomen is soft without significant tenderness, masses, or guarding. Bowel sounds are normoactive. MSK No gross joint deformities. SKIN Normal coloration, warm, dry. NEURO   normal speech, no lateralizing weakness. PSYCH Awake, alert, oriented x 3    Medications:   Medications:    torsemide  20 mg Oral Daily    sildenafil  10 mg Oral BID    lisinopril  5 mg Oral Daily    carvedilol  12.5 mg Oral BID WC    potassium chloride  20 mEq Oral BID    sodium chloride flush  10 mL Intravenous 2 times per day    calcitRIOL  0.25 mcg Oral Daily    gabapentin  300 mg Oral TID    latanoprost  1 drop Both Eyes Nightly    oxybutynin  5 mg Oral Daily    QUEtiapine  25 mg Oral Nightly    budesonide-formoterol  2 puff Inhalation BID    acyclovir  400 mg Oral BID    sodium chloride flush  10 mL Intravenous 2 times per day    enoxaparin  40 mg Subcutaneous Daily    brimonidine  1 drop Both Eyes BID    And    timolol  1 drop Both Eyes BID      Infusions:   PRN Meds: HYDROcodone-acetaminophen, 1 tablet, Q6H PRN  naloxone, 0.4 mg, PRN  sodium chloride flush, 10 mL, PRN  acetaminophen, 650 mg, Q4H PRN  traMADol, 50 mg, Q6H PRN  sodium chloride flush, 10 mL, PRN  promethazine, 12.5 mg, Q6H PRN    Or  ondansetron, 4 mg, Q6H PRN  polyethylene glycol, 17 g, Daily PRN        CBC   No results for input(s): WBC, HGB, HCT, PLT in the last 72 hours.    BMP   Recent Labs     03/19/21  0434 03/20/21  0524 03/21/21  0327   * 141 146*   K 3.9 3.6 3.9    99 100   CO2 35* 36* 39*   BUN 17 20 28*   CREATININE 1.3* 1.4* 1.4*       Radiology report reviewed     Electronically signed by Torie Parekh MD on 3/21/2021 at 11:00 AM

## 2021-03-21 NOTE — PROGRESS NOTES
Daily Progress Note    Feeling better  Going to rehab  Leg edema improved  Continue medical treatment  Ok to d/c from cardiac stand  Bradycardia stable      Pt. Awake, alert and feeling better  HR stable, BP stable, NSR  No CP, SOB better per pt. -still on 7 L NC     Acute on Chronic HFpEF    Worsening SOB and LE edema noted    Has been on O2 the last 2-3 weeks    Elevated BNP- down trending now    Trop neg.    CT chest W/O non-acute    No DVT noted-VQ neg.    ORVILLE shows mod. AS    LHC neg. RHC shows severe PAH     COPD/ PAH    pulm following    Likely related to COPD and YAJAIRA    Using CPAP- need compliance    Started revatio yesterday-tolerating so far    Per pulm     Possibly intermittent CHB? Decreased Coreg yesterday- stable overnight-holter ordered- can place Monday per heart center  Will follow-stable from cardiac standpoint     ORVILLE-3/19/21  IMPRESSION:  1.  No clot or thrombus noted in the left atrium, left atrial appendage,  LV cavity. 2.  Moderate mitral annular calcification noted with mild mitral  regurgitation present. 3.  EF is around 50% range. 4.  No pericardial effusion noted. 5.  No ASD or PFO was noted.   6.  Tricuspid valve and pulmonic valve are grossly normal.  7.  Moderate aortic stenosis noted.  Aortic valve is sclerotic with  trace aortic regurgitation present.     C/RHC-3/18/21  LEFT MAIN PATENT  LAD MILD DX  LCX MILD DX  RCA MILD DX  LVEDP 30  SEVERE ELEVATED RIGHT HEART PRESSURES  CHECK ORVILLE FOR MR AND HAS MODERATE AS  DIURESIS FOR NOW  RIGHT GROIN   NO COMPLICATIONS      PAST MEDICAL HISTORY:  History of having aortic stenosis present,  history of hypertension, history of having questionable COPD present,  arthritis, she had Nghia filter placement in the past for DVT.  She  had kidney stones and she had I think renal failure and she was on _____  in the remote past also.  This was in 2015, but she has recovered well.     PAST SURGICAL HISTORY:  Nghia filter placement, gallbladder  surgery, and hysterectomy done.     SOCIAL HISTORY:  She does not smoke, does not drink.     ALLERGIES:  DIAMOX.     MEDICATIONS AT HOME:  She is on Glucotrol, Ditropan, Ultram, fish oil,  Neurontin, Seroquel, Lasix, and Coreg. Objective:   /64   Pulse 68   Temp 97.7 °F (36.5 °C) (Oral)   Resp 15   Ht 5' 4.02\" (1.626 m)   Wt 244 lb 1.6 oz (110.7 kg)   SpO2 95%   BMI 41.88 kg/m²       Intake/Output Summary (Last 24 hours) at 3/21/2021 1025  Last data filed at 3/21/2021 0347  Gross per 24 hour   Intake --   Output 2300 ml   Net -2300 ml       Medications:   Scheduled Meds:   torsemide  20 mg Oral Daily    sildenafil  10 mg Oral BID    lisinopril  5 mg Oral Daily    carvedilol  12.5 mg Oral BID WC    potassium chloride  20 mEq Oral BID    sodium chloride flush  10 mL Intravenous 2 times per day    calcitRIOL  0.25 mcg Oral Daily    gabapentin  300 mg Oral TID    latanoprost  1 drop Both Eyes Nightly    oxybutynin  5 mg Oral Daily    QUEtiapine  25 mg Oral Nightly    budesonide-formoterol  2 puff Inhalation BID    acyclovir  400 mg Oral BID    sodium chloride flush  10 mL Intravenous 2 times per day    enoxaparin  40 mg Subcutaneous Daily    brimonidine  1 drop Both Eyes BID    And    timolol  1 drop Both Eyes BID      Infusions:     PRN Meds:  HYDROcodone-acetaminophen, naloxone, sodium chloride flush, acetaminophen, traMADol, sodium chloride flush, promethazine **OR** ondansetron, polyethylene glycol       Physical Exam:  Vitals:    03/21/21 0810   BP:    Pulse:    Resp: 15   Temp:    SpO2: 95%        General: AAO, NAD  Chest: Nontender  Cardiac: First and Second Heart Sounds are Normal, No Murmurs or Gallops noted  Lungs:Clear to auscultation and percussion. Abdomen: Soft, NT, ND, +BS  Extremities: No clubbing, no edema  Vascular:  Equal 2+ peripheral pulses. Lab Data:  CBC: No results for input(s): WBC, HGB, HCT, MCV, PLT in the last 72 hours.   BMP:   Recent Labs     03/19/21  0434 03/20/21  0524 03/21/21  0327   * 141 146*   K 3.9 3.6 3.9    99 100   CO2 35* 36* 39*   BUN 17 20 28*   CREATININE 1.3* 1.4* 1.4*     LIVER PROFILE: No results for input(s): AST, ALT, LIPASE, BILIDIR, BILITOT, ALKPHOS in the last 72 hours. Invalid input(s): AMYLASE,  ALB  PT/INR: No results for input(s): PROTIME, INR in the last 72 hours. APTT: No results for input(s): APTT in the last 72 hours. BNP:  No results for input(s): BNP in the last 72 hours.       Assessment:  Patient Active Problem List    Diagnosis Date Noted    SOB (shortness of breath) 03/17/2021    Mild obstructive sleep apnea 03/08/2021    Moderate COPD (chronic obstructive pulmonary disease) ([facility] [address] 03/08/2021    Overflow incontinence of urine 01/19/2021    Chronic respiratory failure ([address] 01/04/2021    Moderate to severe pulmonary hypertension ([facility] [address] 01/04/2021    Stage 3a chronic kidney disease 10/12/2020    Secondary hyperparathyroidism of renal origin ([facility] [address] 10/12/2020    Morbid obesity with BMI of 45.0-49.9, adult ([facility] [address] 09/17/2017    Primary insomnia 05/09/2016    Urge incontinence of urine 11/17/2015    Dry eye syndrome 11/17/2015    Glaucoma 11/17/2015    Essential hypertension 10/13/2015    Pedal edema 10/13/2015    Neuropathy of right lower extremity 10/13/2015    CKD (chronic kidney disease) 07/16/2015    Renal calculus, left        Electronically signed by Martina Mujica PA-C on 3/21/2021 at 9:05 AM   ,Electronically signed by Jae Cantrell MD on 3/21/21 at 10:11 AM EDT

## 2021-03-22 LAB
ANION GAP SERPL CALCULATED.3IONS-SCNC: 6 MMOL/L (ref 4–16)
BUN BLDV-MCNC: 32 MG/DL (ref 6–23)
CALCIUM SERPL-MCNC: 9.6 MG/DL (ref 8.3–10.6)
CHLORIDE BLD-SCNC: 101 MMOL/L (ref 99–110)
CO2: 38 MMOL/L (ref 21–32)
CREAT SERPL-MCNC: 1.5 MG/DL (ref 0.6–1.1)
EKG ATRIAL RATE: 59 BPM
EKG ATRIAL RATE: 60 BPM
EKG DIAGNOSIS: NORMAL
EKG DIAGNOSIS: NORMAL
EKG P AXIS: 20 DEGREES
EKG P AXIS: 22 DEGREES
EKG P-R INTERVAL: 258 MS
EKG P-R INTERVAL: 258 MS
EKG Q-T INTERVAL: 440 MS
EKG Q-T INTERVAL: 446 MS
EKG QRS DURATION: 94 MS
EKG QRS DURATION: 98 MS
EKG QTC CALCULATION (BAZETT): 440 MS
EKG QTC CALCULATION (BAZETT): 441 MS
EKG R AXIS: -18 DEGREES
EKG R AXIS: -25 DEGREES
EKG T AXIS: -39 DEGREES
EKG T AXIS: -39 DEGREES
EKG VENTRICULAR RATE: 59 BPM
EKG VENTRICULAR RATE: 60 BPM
GFR AFRICAN AMERICAN: 41 ML/MIN/1.73M2
GFR NON-AFRICAN AMERICAN: 33 ML/MIN/1.73M2
GLUCOSE BLD-MCNC: 86 MG/DL (ref 70–99)
MAGNESIUM: 2.2 MG/DL (ref 1.8–2.4)
POTASSIUM SERPL-SCNC: 5.1 MMOL/L (ref 3.5–5.1)
SODIUM BLD-SCNC: 145 MMOL/L (ref 135–145)

## 2021-03-22 PROCEDURE — 93010 ELECTROCARDIOGRAM REPORT: CPT | Performed by: INTERNAL MEDICINE

## 2021-03-22 PROCEDURE — 1200000000 HC SEMI PRIVATE

## 2021-03-22 PROCEDURE — 6370000000 HC RX 637 (ALT 250 FOR IP): Performed by: INTERNAL MEDICINE

## 2021-03-22 PROCEDURE — 2700000000 HC OXYGEN THERAPY PER DAY

## 2021-03-22 PROCEDURE — 97530 THERAPEUTIC ACTIVITIES: CPT

## 2021-03-22 PROCEDURE — 97535 SELF CARE MNGMENT TRAINING: CPT

## 2021-03-22 PROCEDURE — 94761 N-INVAS EAR/PLS OXIMETRY MLT: CPT

## 2021-03-22 PROCEDURE — 97166 OT EVAL MOD COMPLEX 45 MIN: CPT

## 2021-03-22 PROCEDURE — 97116 GAIT TRAINING THERAPY: CPT

## 2021-03-22 PROCEDURE — 2580000003 HC RX 258: Performed by: INTERNAL MEDICINE

## 2021-03-22 PROCEDURE — 36415 COLL VENOUS BLD VENIPUNCTURE: CPT

## 2021-03-22 PROCEDURE — 80048 BASIC METABOLIC PNL TOTAL CA: CPT

## 2021-03-22 PROCEDURE — 94640 AIRWAY INHALATION TREATMENT: CPT

## 2021-03-22 PROCEDURE — 97162 PT EVAL MOD COMPLEX 30 MIN: CPT

## 2021-03-22 PROCEDURE — 83735 ASSAY OF MAGNESIUM: CPT

## 2021-03-22 RX ADMIN — CALCITRIOL CAPSULES 0.25 MCG 0.25 MCG: 0.25 CAPSULE ORAL at 08:36

## 2021-03-22 RX ADMIN — QUETIAPINE FUMARATE 25 MG: 25 TABLET ORAL at 20:20

## 2021-03-22 RX ADMIN — BUDESONIDE AND FORMOTEROL FUMARATE DIHYDRATE 2 PUFF: 160; 4.5 AEROSOL RESPIRATORY (INHALATION) at 08:35

## 2021-03-22 RX ADMIN — CARVEDILOL 12.5 MG: 12.5 TABLET, FILM COATED ORAL at 08:36

## 2021-03-22 RX ADMIN — BRIMONIDINE TARTRATE 1 DROP: 2 SOLUTION OPHTHALMIC at 08:37

## 2021-03-22 RX ADMIN — TRAMADOL HYDROCHLORIDE 50 MG: 50 TABLET, FILM COATED ORAL at 20:26

## 2021-03-22 RX ADMIN — SILDENAFIL CITRATE 10 MG: 20 TABLET ORAL at 08:36

## 2021-03-22 RX ADMIN — TORSEMIDE 20 MG: 20 TABLET ORAL at 08:36

## 2021-03-22 RX ADMIN — LISINOPRIL 5 MG: 5 TABLET ORAL at 08:36

## 2021-03-22 RX ADMIN — RIVAROXABAN 10 MG: 10 TABLET, FILM COATED ORAL at 17:58

## 2021-03-22 RX ADMIN — TIMOLOL MALEATE 1 DROP: 5 SOLUTION OPHTHALMIC at 08:37

## 2021-03-22 RX ADMIN — LATANOPROST 1 DROP: 50 SOLUTION/ DROPS OPHTHALMIC at 20:26

## 2021-03-22 RX ADMIN — HYDROCODONE BITARTRATE AND ACETAMINOPHEN 1 TABLET: 7.5; 325 TABLET ORAL at 15:07

## 2021-03-22 RX ADMIN — SILDENAFIL CITRATE 10 MG: 20 TABLET ORAL at 20:19

## 2021-03-22 RX ADMIN — CARVEDILOL 12.5 MG: 12.5 TABLET, FILM COATED ORAL at 17:58

## 2021-03-22 RX ADMIN — GABAPENTIN 300 MG: 300 CAPSULE ORAL at 20:20

## 2021-03-22 RX ADMIN — ACYCLOVIR 400 MG: 200 CAPSULE ORAL at 20:19

## 2021-03-22 RX ADMIN — BRIMONIDINE TARTRATE 1 DROP: 2 SOLUTION OPHTHALMIC at 20:26

## 2021-03-22 RX ADMIN — SODIUM CHLORIDE, PRESERVATIVE FREE 10 ML: 5 INJECTION INTRAVENOUS at 20:20

## 2021-03-22 RX ADMIN — TIMOLOL MALEATE 1 DROP: 5 SOLUTION OPHTHALMIC at 20:26

## 2021-03-22 RX ADMIN — GABAPENTIN 300 MG: 300 CAPSULE ORAL at 13:59

## 2021-03-22 RX ADMIN — SODIUM CHLORIDE, PRESERVATIVE FREE 10 ML: 5 INJECTION INTRAVENOUS at 08:37

## 2021-03-22 RX ADMIN — ACYCLOVIR 400 MG: 200 CAPSULE ORAL at 08:36

## 2021-03-22 RX ADMIN — TRAMADOL HYDROCHLORIDE 50 MG: 50 TABLET, FILM COATED ORAL at 08:37

## 2021-03-22 RX ADMIN — GABAPENTIN 300 MG: 300 CAPSULE ORAL at 08:36

## 2021-03-22 RX ADMIN — HYDROCODONE BITARTRATE AND ACETAMINOPHEN 1 TABLET: 7.5; 325 TABLET ORAL at 05:10

## 2021-03-22 ASSESSMENT — PAIN SCALES - GENERAL
PAINLEVEL_OUTOF10: 7
PAINLEVEL_OUTOF10: 6
PAINLEVEL_OUTOF10: 5
PAINLEVEL_OUTOF10: 7

## 2021-03-22 ASSESSMENT — PAIN DESCRIPTION - PAIN TYPE: TYPE: CHRONIC PAIN

## 2021-03-22 ASSESSMENT — PAIN DESCRIPTION - LOCATION: LOCATION: LEG

## 2021-03-22 NOTE — PLAN OF CARE
Problem: Falls - Risk of:  Goal: Will remain free from falls  Description: Will remain free from falls  3/22/2021 1219 by Christine Barnett  Outcome: Ongoing  3/22/2021 0536 by Kristy Edward RN  Outcome: Ongoing  Goal: Absence of physical injury  Description: Absence of physical injury  3/22/2021 1219 by Christine Barnett  Outcome: Ongoing  3/22/2021 0536 by Kristy Edward RN  Outcome: Ongoing     Problem: Fluid Volume:  Goal: Hemodynamic stability will improve  Description: Hemodynamic stability will improve  3/22/2021 1219 by Christine Barnett  Outcome: Ongoing  3/22/2021 0536 by Kristy Edward RN  Outcome: Ongoing  Goal: Ability to maintain a balanced intake and output will improve  Description: Ability to maintain a balanced intake and output will improve  3/22/2021 1219 by Christine Barnett  Outcome: Ongoing  3/22/2021 0536 by Kristy Edward RN  Outcome: Ongoing     Problem: Health Behavior:  Goal: Identification of resources available to assist in meeting health care needs will improve  Description: Identification of resources available to assist in meeting health care needs will improve  3/22/2021 1219 by Christine Barnett  Outcome: Ongoing  3/22/2021 0536 by Kristy Edward RN  Outcome: Ongoing     Problem: Respiratory:  Goal: Respiratory status will improve  Description: Respiratory status will improve  3/22/2021 1219 by Christine Barnett  Outcome: Ongoing  3/22/2021 0536 by Kristy Edward RN  Outcome: Ongoing     Problem: Pain:  Goal: Pain level will decrease  Description: Pain level will decrease  3/22/2021 1219 by Christine Barnett  Outcome: Ongoing  3/22/2021 0536 by Kristy Edward RN  Outcome: Ongoing  Goal: Control of acute pain  Description: Control of acute pain  3/22/2021 1219 by Christine Barnett  Outcome: Ongoing  3/22/2021 0536 by Kristy Edward RN  Outcome: Ongoing  Goal: Control of chronic pain  Description: Control of chronic pain  3/22/2021 1219 by Corey Polanco  Outcome: Ongoing  3/22/2021 0536 by Catalina Gutierres RN  Outcome: Ongoing

## 2021-03-22 NOTE — CONSULTS
[address] [facility] [address], 1940, 3021/3021-A, 3/22/2021    History  [place]:  The primary encounter diagnosis was Acute pulmonary edema ([address] A diagnosis of Acute on chronic respiratory failure with hypoxia (HCC) was also pertinent to this visit. Patient  has a past medical history of Acute non Q wave MI (myocardial infarction), subsequent episode, Anemia, Arthritis, Diverticulitis, Dry eye syndrome, Glaucoma, Gilead filter in place, History of blood transfusion, Hx of blood clots, HX OTHER MEDICAL, Hypertension, Kidney stones, Obesity (BMI 35.0-39.9 without comorbidity), Primary insomnia, Sepsis due to urinary tract infection ([address] Thyroid disease, Urinary incontinence, and Ventral hernia. Patient  has a past surgical history that includes Dental surgery; other surgical history (2007); Gastric bypass surgery (2001); Colonoscopy (2006); Hysterectomy, total abdominal (1994); Tonsillectomy and adenoidectomy (1940's); Cholecystectomy (2001); joint replacement (Right, 2007); joint replacement (Left, 5-08); joint replacement (Right, 8-08); fracture surgery (Right, 2007); other surgical history (2-08); Breast surgery (Right, 1960); Breast biopsy (Unsure When ); hernia repair (2008); and other surgical history (Left, [id number]). Subjective:  Patient states: \"Getting up is the hardest part. \"    Pain:  Denies pain at rest. States pain in L shoulder when ambulating but does not rate.     Communication with other providers:  Handoff to RN, OT  Restrictions: general precautions, fall risk    Home Setup/Prior level of function  Social/Functional History  Lives With: Alone  Type of Home: House  Home Layout: Two level, Able to Live on Main level with bedroom/bathroom  Home Access: Ramped entrance  Bathroom Shower/Tub: Walk-in shower  Bathroom Toilet: Handicap height  Bathroom Equipment: Grab bars in [address] [place]: Lift chair, Cane, Rolling walker  ADL Assistance: Independent  Homemaking Assistance: Independent  Homemaking Responsibilities: Yes  Ambulation Assistance: Independent  Transfer Assistance: Independent  Active : Yes  Mode of Transportation: Car  Occupation: Retired  Additional Comments: Pt reports  no recent falls    Examination of body systems (includes body structures/functions, activity/participation limitations):  · Observation:  Pt sitting EOB with OT upon arrival and agreeable to therapy  · Vision:  Wears glasses  · Hearing:  [employer] [facility] [email or web address]  · Cardiopulmonary:  7L O2 at rest, 8L O2 when ambulating due to tank options  · Cognition: WFL, see OT/SLP note for further evaluation. Musculoskeletal  · ROM R/L:  WFL. · Strength R/L:  4/5, moderate impairment in function and endurance. · Neuro:  [facility] [facility]      Mobility:  · Rolling L/R:  NT, per OT note pt SBA  · Supine to sit:  NT, per OT note pt SBA  · Transfers: Pt completed STS from EOB CGA and STS from chair x2 mod-max A with cues for sequencing  · Sitting balance:  good. · Standing balance:  Fair+, pt stood at sink 5 minutes CGA while performing dynamic UE activities with no LOB. · Gait: Pt ambulated 15' + 28' with RW CGA with seated rest break in between. Pt ambulated with forward flexed posture, decreased yudy, overreliance on UEs, and chair follow for increased safety. Cues provided for standing posture and walker management. SpO2 82-91% on 7L during first bout and 95-99% during second bout on 8 L. Cues for pursed lip breathing provided throughout. [facility] 6 Clicks Inpatient Mobility:  AM-PAC Inpatient Mobility Raw Score : 13    Safety: patient left in chair with alarm on, call light within reach, RN notified, gait belt used. Assessment:  Pt is a 79 y.o. female admitted to the hospital for SOB. Pt is typically ambulating and transferring independently with RW. Pt currently requires mod A for STS transfers, CGA for ambulation with RW and a chair follow.  Pt is presenting with decreased endurance, impaired balance, impaired transfers, impaired gait, impaired bed mobility, and increased pain this session. Pt would benefit from continued acute care PT as well as SNF placement after discharge to continue to address impairments. Complexity: moderate    Prognosis: Good, no significant barriers to participation at this time.      Plan Times per week: 3+/week     Equipment: TBD at next level of care    Goals:  Short term goals  Time Frame for Short term goals: 1 week  Short term goal 1: Pt to complete all bed mobility mod I  Short term goal 2: Pt to complete all transfers to/from bed, commode, and chair Nilo  Short term goal 3: Pt to ambualte 36' with LRAD and CGA       Treatment plan:  Bed mobility, transfers, balance, gait, TA, TX,    Recommendations for NURSING mobility: amb with RW and gait belt    Time:   Time in: 0952  Time out: 1026  Timed treatment minutes: 24  Total time: 34    Electronically signed by:    Jennifer Cooper PT  3/22/2021, 11:55 AM

## 2021-03-22 NOTE — PROGRESS NOTES
Pt O2 dropped to 86% on 4L O2 nasal cannula. Increased to 5 L with no improvement. Increased to 7L with sats going back to 94 %. Pt maintaining 94% on 5-6L at this time.

## 2021-03-22 NOTE — PROGRESS NOTES
Hospitalist Progress Note      Name:  Harlan Payne /Age/Sex: 1940  (79 y.o. female)   MRN & CSN:  [id number] & [id number] Admission Date/Time: 3/17/2021  3:39 PM   Location:  [address] [place] [address]- PCP: Olga Lidia Peters MD         Hospital Day: 6    Assessment and Plan:   Harlan Payne is a 79 y.o.  female  who presents with SOB. -Acute on chronic diastolic CHF   -Pulmomary HTN  -Acute hypoxic RF due to CHF, pulmonary HTN  RHC showed elevated LVEDP, and pulmonary hypertension. O2 reqt remains at 6 LPM.  Off IV lasix. Continue PO torsemide 20 mg daily. Sildenafil started by cardiologist for pHTN. -COPD:continue symbicort inhaler. CPAP at night as tolerated. -Moderate Aortic stenosis on ORVILLE  -Elevated D dimer  - no DVT on US. VQ scan low probability for PE. -HTN: continue coreg, amlodipine  -CKD3 - Cr stable.   -Neuropathy: gabapentin 300 mg TID  -Morbid obesity (BMI 43) - dietary and lifestyle modification advised. Diet DIET CARDIAC; Lactose Controlled   DVT Prophylaxis [x] Lovenox, []  Heparin, [] SCDs, [] Ambulation   GI Prophylaxis [] PPI,  [] H2 Blocker,  [] Carafate,  [] Diet/Tube Feeds   Code Status Full Code   Disposition Pending PT/OT eval. Likely to ECF. Medically stable for dc. MDM [] Low, [x] Moderate,[]  High     History of Present Illness:   Patient seen and examined. No acute issues overnight. VS stable. Oxygen saturation dropped below 90% when oxygen was reduced to 4 LPM so it had to be raised back up to 6-7L. Ten point ROS reviewed negative, unless as noted above    Objective: Intake/Output Summary (Last 24 hours) at 3/22/2021 1003  Last data filed at 3/22/2021 0705  Gross per 24 hour   Intake 360 ml   Output 1125 ml   Net -765 ml      Vitals:   Vitals:    21 0900   BP:    Pulse:    Resp:    Temp:    SpO2: (!) 86%     Physical Exam:   GEN Awake female, sitting upright in bed. RESP Comfortable on 6 LPM oxygen via NC.  CARDIO/VASC S1/S2 auscultated.

## 2021-03-22 NOTE — PROGRESS NOTES
[facility] ACUTE CARE OCCUPATIONAL THERAPY EVALUATION  Marion Loving, 1940, 3021/3021-A, 3/22/2021    History  [place]:  The primary encounter diagnosis was Acute pulmonary edema ([address] A diagnosis of Acute on chronic respiratory failure with hypoxia (HCC) was also pertinent to this visit. Patient  has a past medical history of Acute non Q wave MI (myocardial infarction), subsequent episode, Anemia, Arthritis, Diverticulitis, Dry eye syndrome, Glaucoma, Nghia filter in place, History of blood transfusion, Hx of blood clots, HX OTHER MEDICAL, Hypertension, Kidney stones, Obesity (BMI 35.0-39.9 without comorbidity), Primary insomnia, Sepsis due to urinary tract infection ([address] Thyroid disease, Urinary incontinence, and Ventral hernia. Patient  has a past surgical history that includes Dental surgery; other surgical history (2007); Gastric bypass surgery (2001); Colonoscopy (2006); Hysterectomy, total abdominal (1994); Tonsillectomy and adenoidectomy (1940's); Cholecystectomy (2001); joint replacement (Right, 2007); joint replacement (Left, 5-08); joint replacement (Right, 8-08); fracture surgery (Right, 2007); other surgical history (2-08); Breast surgery (Right, 1960); Breast biopsy (Unsure When ); hernia repair (2008); and other surgical history (Left, [id number]). Subjective:  Patient states:  \"I had hip surgery on my right a few years ago.  Ever since then that leg will giv eout on me due to nerve damage\"    Pain:  L shoulder increased pain ~5/10 pain w/ increased movement    Communication with other providers:  Handoff to RN, PT entered room in middle of session  Restrictions: CRE Contact Precautions, General Precautions, Fall Risk    Home Setup/Prior level of function  Social/Functional History  Lives With: Alone  Type of Home: House  Home Layout: Two level, Able to Live on Main level with bedroom/bathroom  Home Access: Ramped entrance  Bathroom Shower/Tub: Walk-in shower  Bathroom Toilet: Handicap height  Bathroom Equipment: Grab bars in shower  Home Equipment: Lift chair, Cane, Rolling walker, 4L of 02  ADL Assistance: Independent  Homemaking Assistance: Independent  Homemaking Responsibilities: Yes  Ambulation Assistance: Independent  Transfer Assistance: Independent  Active : Yes  Mode of Transportation: Car  Occupation: Retired  Additional Comments: Pt reports  no recent falls    Examination of body systems (includes body structures/functions, activity/participation limitations):  · Observation:  Supine in bed upon arrival, agreeable to therapy  · Vision:  Glasses  · Hearing:  tritrue [place]  · Cardiopulmonary:  5L of 02. 02 saturation dropping ~83% w/ increased activity. PA entered and stated it was ok to increase 02 to 7L. 02 saturation increased ~90% w/ seated rest break. Body Systems and functions:  · ROM R/L:  ~100 degrees bilateral shoulder flexion, distal WFL   · Strength R/L:  3-/5,   · Sensation: R leg decreased sensation  · Tone: BUE hypotonic  · Coordination: WFL  · Perception: WNL    Activities of Daily Living (ADLs):  · Feeding: Jossue (uppers)  · Grooming: CGA (oral care in stand at sink, washing hands/face sitting EOB)  · UB bathing: SBA  · LB bathing: modA  · UB dressing: SBA (doffing/donning gown sitting EOB)  · LB dressing: modA (donning socks sitting EOB)  · Toileting: CGA     Cognitive and Psychosocial Functioning:  · Overall cognitive status: WNL  · Affect: Normal, Pleasant Motivated       Mobility:  · Supine to sit:  SBA  · Transfers: Nilo from EOB up to RW w/ raised bed, STS from reclining chair up to RW modA w/ 2 STS  · Sitting balance:  Supervision. · Standing balance:  CGA w/ RW  · Functional Mobility: CGA w/ RW ~35 feet w/ chair follow.   · Toilet/Shower Transfers: DNT             AM-PAC Daily Activity Inpatient   How much help for putting on and taking off regular lower body clothing?: A Lot  How much help for Bathing?: A Lot  How much help for Toileting?: A Little  How much help for putting on and taking off regular upper body clothing?: A Little  How much help for taking care of personal grooming?: A Little  How much help for eating meals?: None  AM-[facility] Inpatient Daily Activity Raw Score: 17  AM-PAC Inpatient ADL T-Scale Score : 37.26  ADL Inpatient CMS 0-100% Score: 50.11  ADL Inpatient CMS G-Code Modifier : CK    Treatment:  Self Care Training:   Cues were given for safety, sequence, UE/LE placement, visual cues, and balance. Activities performed today included LB dressing, grooming, UB dressing    Therapeutic Activity Training:   Therapeutic activity training was instructed today. Cues were given for safety, sequence, UE/LE placement, awareness, and balance. Activities performed today included bed mobility training, sup-sit, sit-stand, functional mobility, stand to sit      Safety: patient left in chair with chair alarm, call light within reach, RN notified, gait belt used. Assessment:  Pt is a 80 yo female admitted from home for SOB. Pt at baseline is Independent for ADLs Independent for high level IADLs and Independent for functional transfers/mobility w/ AD. Pt currently presents w/ deficits in ADL and high level IADL independence, functional activity tolerance, dynamic sitting and standing balance and tolerance and functional transfers, BUE strength/ROM. Pt would benefit from continued acute care OT services w/ discharge to SNF  Complexity: Moderate  Prognosis: Good, no significant barriers to participation at this time.    Plan  Times per week: 3x+  Times per day: Daily  Current Treatment Recommendations: Strengthening, Endurance Training, Patient/Caregiver Education & Training, ROM, Equipment Evaluation, Education, & procurement, Self-Care / ADL, Balance Training, Pain Management, Home Management Training, Functional Mobility Training, Safety Education & Training, Positioning     Equipment: defer    Goals:  Pt goal: go home  Time Frame for STGs: discharge  Goal 1: Pt will perform UE ADLs Independent  Goal 2: Pt will perform LE ADLs Jossue  Goal 3: Pt will perform toileting Jossue  Goal 4: Pt will perform functional transfer w/ AD Jossue  Goal 5: Pt will perform functional mobility w/ AD Jossue  Goal 6: Pt will perform therex/theract in order to increase functional activity tolerance and dynamic standing balance    Treatment plan:  Pt will perform functional task in stand reaching in all 3 planes in order to increase dynamic standing balance and functional activity tolerance    Recommendations for NURSING activity: Up to chair for all 3 meals and up to standard commode for all toileting needs    Time:   Time in: 0934  Time out: 1026  Timed treatment minutes: 39 minutes  Total time: 49 minutes    Electronically signed by:    Yudelka MAJOR/L [id number]  12:03 PM,3/22/2021

## 2021-03-22 NOTE — PROGRESS NOTES
pulmonary      SUBJECTIVE:  No sob and sleeping     OBJECTIVE    VITALS:  BP (!) 145/95   Pulse 71   Temp 97.7 °F (36.5 °C) (Axillary)   Resp 17   Ht 5' 4.02\" (1.626 m)   Wt 244 lb 1.6 oz (110.7 kg)   SpO2 93%   BMI 41.88 kg/m²   HEAD AND FACE EXAM:  No throat injection, no active exudate,no thrush  NECK EXAM;No JVD, no masses, symmetrical  CHEST EXAM; Expansion equal and symmetrical, no masses  LUNG EXAM; Good breath sounds bilaterally. There are expiratory wheezes both lungs, there are crackles at both lung bases  CARDIOVASCULAR EXAM: Positive S1 and S2, no S3 or S4, no clicks ,no murmurs  RIGHT AND LEFT LOWER EXTRIMITY EXAM: No edema, no swelling, no inflamation            LABS   Lab Results   Component Value Date    WBC 5.4 03/17/2021    HGB 15.7 03/17/2021    HCT 51.8 (H) 03/17/2021    .4 (H) 03/17/2021     03/17/2021     Lab Results   Component Value Date    CREATININE 1.5 (H) 03/22/2021    BUN 32 (H) 03/22/2021     03/22/2021    K 5.1 03/22/2021     03/22/2021    CO2 38 (H) 03/22/2021     Lab Results   Component Value Date    INR 0.99 03/17/2021    PROTIME 12.0 03/17/2021          Lab Results   Component Value Date    PHOS 3.9 01/06/2021    PHOS 3.2 05/31/2019    PHOS 3.9 10/09/2018      No results for input(s): PH, PO2ART, ALU4IGN, HCO3, BEART, O2SAT in the last 72 hours. Wt Readings from Last 3 Encounters:   03/21/21 244 lb 1.6 oz (110.7 kg)   03/08/21 250 lb (113.4 kg)   01/21/21 250 lb (113.4 kg)               ASSESMENT  Ac on ch resp failure  Ac copd  chf  maine        PLAN  1. cpm  2.  She does not use pap rx    3/22/2021  Zora Crawford M.D.

## 2021-03-22 NOTE — CARE COORDINATION
CM in to see Pt to follow up on discharge planning. Pt verified information in last CM note. Pt has home o2 supplied by Pina Parekh. Pt discharge plan is SNF, first choice [facility]. PT/OT ordered, CM whiteboard note to update. CM call to Evon/Ankush for referral.  CM following.

## 2021-03-23 VITALS
RESPIRATION RATE: 22 BRPM | HEIGHT: 64 IN | WEIGHT: 243.6 LBS | BODY MASS INDEX: 41.59 KG/M2 | OXYGEN SATURATION: 94 % | HEART RATE: 74 BPM | SYSTOLIC BLOOD PRESSURE: 113 MMHG | DIASTOLIC BLOOD PRESSURE: 64 MMHG | TEMPERATURE: 98.4 F

## 2021-03-23 LAB
PRO-BNP: 2372 PG/ML
SARS-COV-2, NAAT: NOT DETECTED

## 2021-03-23 PROCEDURE — 87635 SARS-COV-2 COVID-19 AMP PRB: CPT

## 2021-03-23 PROCEDURE — 6370000000 HC RX 637 (ALT 250 FOR IP): Performed by: INTERNAL MEDICINE

## 2021-03-23 PROCEDURE — 2700000000 HC OXYGEN THERAPY PER DAY

## 2021-03-23 PROCEDURE — 94640 AIRWAY INHALATION TREATMENT: CPT

## 2021-03-23 PROCEDURE — 83880 ASSAY OF NATRIURETIC PEPTIDE: CPT

## 2021-03-23 PROCEDURE — 97530 THERAPEUTIC ACTIVITIES: CPT

## 2021-03-23 PROCEDURE — 2580000003 HC RX 258: Performed by: INTERNAL MEDICINE

## 2021-03-23 PROCEDURE — 36415 COLL VENOUS BLD VENIPUNCTURE: CPT

## 2021-03-23 PROCEDURE — 99223 1ST HOSP IP/OBS HIGH 75: CPT | Performed by: INTERNAL MEDICINE

## 2021-03-23 PROCEDURE — 94761 N-INVAS EAR/PLS OXIMETRY MLT: CPT

## 2021-03-23 RX ORDER — LISINOPRIL 5 MG/1
5 TABLET ORAL DAILY
Qty: 30 TABLET | Refills: 3
Start: 2021-03-24

## 2021-03-23 RX ORDER — CARVEDILOL 12.5 MG/1
12.5 TABLET ORAL 2 TIMES DAILY WITH MEALS
Qty: 60 TABLET | Refills: 3
Start: 2021-03-23 | End: 2021-04-21 | Stop reason: SDUPTHER

## 2021-03-23 RX ORDER — TORSEMIDE 20 MG/1
20 TABLET ORAL DAILY
Qty: 30 TABLET | Refills: 3
Start: 2021-03-24

## 2021-03-23 RX ORDER — SILDENAFIL CITRATE 20 MG/1
10 TABLET ORAL 2 TIMES DAILY
Qty: 30 TABLET | Refills: 3
Start: 2021-03-23 | End: 2021-04-21 | Stop reason: ALTCHOICE

## 2021-03-23 RX ADMIN — GABAPENTIN 300 MG: 300 CAPSULE ORAL at 08:54

## 2021-03-23 RX ADMIN — TIMOLOL MALEATE 1 DROP: 5 SOLUTION OPHTHALMIC at 08:55

## 2021-03-23 RX ADMIN — RIVAROXABAN 10 MG: 10 TABLET, FILM COATED ORAL at 17:33

## 2021-03-23 RX ADMIN — CARVEDILOL 12.5 MG: 12.5 TABLET, FILM COATED ORAL at 08:54

## 2021-03-23 RX ADMIN — SODIUM CHLORIDE, PRESERVATIVE FREE 10 ML: 5 INJECTION INTRAVENOUS at 08:55

## 2021-03-23 RX ADMIN — SILDENAFIL CITRATE 10 MG: 20 TABLET ORAL at 08:53

## 2021-03-23 RX ADMIN — LISINOPRIL 5 MG: 5 TABLET ORAL at 08:54

## 2021-03-23 RX ADMIN — TORSEMIDE 20 MG: 20 TABLET ORAL at 08:54

## 2021-03-23 RX ADMIN — GABAPENTIN 300 MG: 300 CAPSULE ORAL at 13:47

## 2021-03-23 RX ADMIN — BRIMONIDINE TARTRATE 1 DROP: 2 SOLUTION OPHTHALMIC at 08:55

## 2021-03-23 RX ADMIN — CALCITRIOL CAPSULES 0.25 MCG 0.25 MCG: 0.25 CAPSULE ORAL at 08:54

## 2021-03-23 RX ADMIN — HYDROCODONE BITARTRATE AND ACETAMINOPHEN 1 TABLET: 7.5; 325 TABLET ORAL at 09:01

## 2021-03-23 RX ADMIN — ACYCLOVIR 400 MG: 200 CAPSULE ORAL at 08:54

## 2021-03-23 RX ADMIN — BUDESONIDE AND FORMOTEROL FUMARATE DIHYDRATE 2 PUFF: 160; 4.5 AEROSOL RESPIRATORY (INHALATION) at 08:52

## 2021-03-23 NOTE — FLOWSHEET NOTE
[facility] here to transport patient to [facility] via stretcher. Patients belongings with patient.

## 2021-03-23 NOTE — PROGRESS NOTES
Occupational Therapy    Occupational Therapy Treatment Note  Name: Simin Ross MRN: [id number] :   1940   Date:  3/23/2021   Admission Date: 3/17/2021 Room:  [address] [place] [address]   Restrictions/Precautions:    General Precautions, Fall Risk, CRE Contact Precautions  Communication with other providers:  Nursing handoff  Subjective:  Patient states:  \"I fele dizzy\" Pt stated sitting EOB  Pain:   Location, Type, Intensity (0/10 to 10/10): Denies  Objective:    Observation:  Pt received supine in bed, agreeable to therapy  Objective Measures:  BP supine in bed: 52/32 (40); sitting EOB: 57/42 (47) dizziness did not go away despite ankle pumps; supine after sittin/84 (90)  Treatment, including education:  Therapeutic Activity Training:   Therapeutic activity training was instructed today. Cues were given for safety, sequence, UE/LE placement, awareness, and balance. Activities performed today included bed mobility training, sup-sit, sitting balance, sit to supine, scooting to [address] Training:   Cues were given for safety, sequence, UE/LE placement, visual cues, and balance. Activities performed today included grooming, LB dressing      LB dressing donning socks maxA. Supine to sit SBA, sitting EOB Supervision (See Objective Measures section for why pt did not stand) washing face w/ warm washcloth Supervision, Sit to supine modA, Scooting to HOB: MaxA    Pt supine in bed, bed alarm on, call light at side, nursing notified    Assessment / Impression:        Patient's tolerance of treatment:  Pt tolerated treatment poorly   Adverse Reaction: decreased BP  Significant change in status and impact:  Decreased BP  Barriers to improvement:  Decreased BP  Plan for Next Session:    Pt will perform functional task in stand reaching in all 3 planes to increase dynamic standing balance    Time in:  1024  Time out:  1036  Timed treatment minutes:  12 minutes  Total treatment time:  12 minutes  Electronically signed by:    Cheri MAJOR/L [id number]  11:11 AM,3/23/2021     Previously filed values:    Goals:  Pt goal: go home  Time Frame for STGs: discharge  Goal 1: Pt will perform UE ADLs Independent  Goal 2: Pt will perform LE ADLs Jossue  Goal 3: Pt will perform toileting Jossue  Goal 4: Pt will perform functional transfer w/ AD Jossue  Goal 5: Pt will perform functional mobility w/ AD Jossue  Goal 6: Pt will perform therex/theract in order to increase functional activity tolerance and dynamic standing balance

## 2021-03-23 NOTE — PROGRESS NOTES
Daily Progress Note    Feeling better  Being d/c to ECF  Keep on current cardiac meds  F/u in office in few weeks   Pt. Awake, alert and feeling ok  HR stable, BP stable, NSR  No CP, SOB stable- On NC, still not using CPAP-states she forgot to put it on yesterday before falling asleep     Acute on Chronic HFpEF    Worsening SOB and LE edema noted    Has been on O2 the last 2-3 weeks    Elevated BNP- down trending now    Trop neg.    CT chest W/O non-acute    No DVT noted-VQ neg.    ORV[place]LE shows mod. AS    LHC neg.  Benedict Gores shows severe PAH     COPD/ PAH    pulm following    Likely related to COPD and YAJAIRA    Using CPAP- need compliance    Started revatio-tolerating so far    Per pulm     Possibly intermittent CHB? Decreased Coreg - stable overnight-holter to be done OP  Will follow-stable from cardiac standpoint     [place]-3/19/21  IMPRESSION:  1.  No clot or thrombus noted in the left atrium, left atrial appendage,  LV cavity. 2.  Moderate mitral annular calcification noted with mild mitral  regurgitation present. 3.  EF is around 50% range. 4.  No pericardial effusion noted. 5.  No ASD or PFO was noted.   6.  Tricuspid valve and pulmonic valve are grossly normal.  7.  Moderate aortic stenosis noted.  Aortic valve is sclerotic with  trace aortic regurgitation present.     LHC/RHC-3/18/21  LEFT MAIN PATENT  LAD MILD DX  LCX MILD DX  RCA MILD DX  LVEDP 30  SEVERE ELEVATED RIGHT HEART PRESSURES  CHECK ORVILLE FOR MR AND HAS MODERATE AS  DIURESIS FOR NOW  RIGHT GROIN   NO COMPLICATIONS      PAST MEDICAL HISTORY:  History of having aortic stenosis present,  history of hypertension, history of having questionable COPD present,  arthritis, she had [place] filter placement in the past for DVT.  She  had kidney stones and she had I think renal failure and she was on _____  in the remote past also.  This was in 2015, but she has recovered well.     PAST SURGICAL HISTORY:  Nghia filter placement, gallbladder  surgery, and hysterectomy done.     SOCIAL HISTORY:  She does not smoke, does not drink.     ALLERGIES:  DIAMOX.     MEDICATIONS AT HOME:  She is on Glucotrol, Ditropan, Ultram, fish oil,  Neurontin, Seroquel, Lasix, and Coreg. Objective:   BP (!) 111/56   Pulse 72   Temp 98.5 °F (36.9 °C) (Oral)   Resp 19   Ht 5' 4.02\" (1.626 m)   Wt 243 lb 9.6 oz (110.5 kg)   SpO2 95%   BMI 41.79 kg/m²       Intake/Output Summary (Last 24 hours) at 3/23/2021 1008  Last data filed at 3/23/2021 8797  Gross per 24 hour   Intake 20 ml   Output 1300 ml   Net -1280 ml       Medications:   Scheduled Meds:   rivaroxaban  10 mg Oral Daily    torsemide  20 mg Oral Daily    sildenafil  10 mg Oral BID    lisinopril  5 mg Oral Daily    carvedilol  12.5 mg Oral BID WC    sodium chloride flush  10 mL Intravenous 2 times per day    calcitRIOL  0.25 mcg Oral Daily    gabapentin  300 mg Oral TID    latanoprost  1 drop Both Eyes Nightly    oxybutynin  5 mg Oral Daily    QUEtiapine  25 mg Oral Nightly    budesonide-formoterol  2 puff Inhalation BID    acyclovir  400 mg Oral BID    sodium chloride flush  10 mL Intravenous 2 times per day    brimonidine  1 drop Both Eyes BID    And    timolol  1 drop Both Eyes BID      Infusions:     PRN Meds:  HYDROcodone-acetaminophen, naloxone, sodium chloride flush, acetaminophen, traMADol, sodium chloride flush, promethazine **OR** ondansetron, polyethylene glycol       Physical Exam:  Vitals:    03/23/21 0855   BP:    Pulse:    Resp:    Temp:    SpO2: 95%        General: AAO, NAD  Chest: Nontender  Cardiac: First and Second Heart Sounds are Normal, No Murmurs or Gallops noted  Lungs:Clear to auscultation and percussion. Abdomen: Soft, NT, ND, +BS  Extremities: No clubbing, no edema  Vascular:  Equal 2+ peripheral pulses. Lab Data:  CBC: No results for input(s): WBC, HGB, HCT, MCV, PLT in the last 72 hours.   BMP:   Recent Labs     03/21/21  0327 03/22/21  0417   * 145   K 3.9 5.1   CL 100 101   CO2 39* 38*   BUN 28* 32*   CREATININE 1.4* 1.5*     LIVER PROFILE: No results for input(s): AST, ALT, LIPASE, BILIDIR, BILITOT, ALKPHOS in the last 72 hours. Invalid input(s): AMYLASE,  ALB  PT/INR: No results for input(s): PROTIME, INR in the last 72 hours. APTT: No results for input(s): APTT in the last 72 hours. BNP:  No results for input(s): BNP in the last 72 hours.       Assessment:  Patient Active Problem List    Diagnosis Date Noted    SOB (shortness of breath) 03/17/2021    Mild obstructive sleep apnea 03/08/2021    Moderate COPD (chronic obstructive pulmonary disease) ([facility] 03/08/2021    Overflow incontinence of urine 01/19/2021    Chronic respiratory failure ([facility] 01/04/2021    Moderate to severe pulmonary hypertension ([facility] 01/04/2021    Stage 3a chronic kidney disease 10/12/2020    Secondary hyperparathyroidism of renal origin ([facility] [address] 10/12/2020    Morbid obesity with BMI of 45.0-49.9, adult ([facility] [address] 09/17/2017    Primary insomnia 05/09/2016    Urge incontinence of urine 11/17/2015    Dry eye syndrome 11/17/2015    Glaucoma 11/17/2015    Essential hypertension 10/13/2015    Pedal edema 10/13/2015    Neuropathy of right lower extremity 10/13/2015    CKD (chronic kidney disease) 07/16/2015    Renal calculus, left        Electronically signed by Mikki Styles PA-C on 3/23/2021 at 10:08 AM   Electronically signed by Lluvia Peterson MD on 3/23/21 at 1:43 PM EDT

## 2021-03-23 NOTE — CARE COORDINATION
CM contacted by Evon/Ankush. Pt can be accepted when medically ready. PS to Dr. Eh Chavarria to update. 9:48 AM   PAS/RR completed    12:24 PM   Pt on discharge, Torres/Ankush updated.    CM set transport with [facility] for [address]

## 2021-03-23 NOTE — DISCHARGE INSTR - COC
Continuity of Care Form    Patient Name: Simin Ross   :  1940  MRN:  [id number]    Admit date:  3/17/2021  Discharge date: 3/23/2021    Code Status Order: Full Code   Advance Directives:   [facility] Documentation       Date/Time Healthcare Directive Type of Healthcare Directive Copy in [address] Agent's Name Healthcare Agent's Phone Number    [phone]  Yes, patient has an advance directive for healthcare treatment  Durable power of  for health care  --  Adult siblings  Patrizia Moreno  [phone]            Admitting Physician:  Priya Guerrier MD  PCP: Zackery Kramer MD    Discharging Nurse: [facility] Unit/Room#: 3021/3021-A  Discharging Unit Phone Number: ***    Emergency Contact:   Extended Emergency Contact Information  Primary Emergency Contact: [address] Jimenez Phone: [phone]  Relation: Brother/Sister    Past Surgical History:  Past Surgical History:   Procedure Laterality Date    BREAST BIOPSY  Unsure When     Unsure Which Breast, Benign    BREAST SURGERY Right     \"Infected Milk Gland\"    CHOLECYSTECTOMY  2001    Done During Gastric Bypass    COLONOSCOPY  2006    Diverticulitis    DENTAL SURGERY      Teeth Extracted In Past    FRACTURE SURGERY Right     \"Right Femur Shattered\"    GASTRIC BYPASS SURGERY      \"Lost About 125 Pounds, Gallbladder Also Taken Out\"    HERNIA REPAIR  2008    Abdominal Hernia Repair    HYSTERECTOMY, TOTAL ABDOMINAL  1994    JOINT REPLACEMENT Right 2007    Total Right Hip, \"Dr. Jaimee Randhawa Cut The Nerves During The Surgery\"    JOINT REPLACEMENT Left     Total Left Knee    JOINT REPLACEMENT Right     Total Right Knee    OTHER SURGICAL HISTORY      Nghia Filter    OTHER SURGICAL HISTORY      \"Hardware Removed Right Femur\"    OTHER SURGICAL HISTORY Left [id number]    left percutaneous nephrostomy placed, left ureteral stent lalced,     TONSILLECTOMY AND ADENOIDECTOMY  1940's       Immunization History:   Immunization History   Administered Date(s) Administered    Influenza Vaccine, unspecified formulation 10/20/2015    Influenza, High Dose (Fluzone 65 yrs and older) 10/26/2016, 09/14/2017    Pneumococcal Conjugate 13-valent ([id number]) 04/26/2016    Pneumococcal Polysaccharide ([id number]) 10/01/2011    Zoster Live (Zostavax) 01/01/2013       Active Problems:  Patient Active Problem List   Diagnosis Code    Renal calculus, left N20.0    CKD (chronic kidney disease) N18.9    Essential hypertension I10    Pedal edema R60.0    Neuropathy of right lower extremity G57.91    Urge incontinence of urine N39.41    Dry eye syndrome H04.129    Glaucoma H40.9    Primary insomnia F51.01    Morbid obesity with BMI of 45.0-49.9, adult ([facility]) E66.01, Z68.42    Stage 3a chronic kidney disease N18.31    Secondary hyperparathyroidism of renal origin ([facility] [address] N25.81    Chronic respiratory failure ([facility]) J96.10    Moderate to severe pulmonary hypertension ([facility]) I27.20    Overflow incontinence of urine N39.490    Mild obstructive sleep apnea G47.33    Moderate COPD (chronic obstructive pulmonary disease) ([facility]) J44.9    SOB (shortness of breath) R06.02       Isolation/Infection:   Isolation            Contact          Patient Infection Status       Infection Onset Added Last Indicated Last Indicated By Review Planned Expiration Resolved Resolved By    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 08/25/15 Veola Idalia        8/19/15 Klebsiella pneumoniae, carbapenemase , possible KPC, urine    Resolved    COVID-19 Rule Out 03/17/21 03/17/21 03/17/21 COVID-19, Rapid (Ordered)   03/17/21 Rule-Out Test Resulted    COVID-19 Rule Out 01/09/21 01/09/21 01/09/21 Covid-19 Ambulatory (Ordered)   01/10/21 Rule-Out Test Resulted            Nurse Assessment:  Last Vital Signs: BP (!) 111/56   Pulse 72   Temp 98.5 °F (36.9 °C) (Oral)   Resp 19   Ht 5' 4.02\" (1.626 m)   Wt 243 lb 9.6 oz (110.5 kg)   SpO2 95%   BMI 41.79 kg/m²     Last documented pain score (0-10 scale): Pain Level: 8  Last Weight:   Wt Readings from Last 1 Encounters:   03/23/21 243 lb 9.6 oz (110.5 kg)     Mental Status:  oriented and alert    IV Access:  - None    Nursing Mobility/ADLs:  Walking   Assisted  Transfer  Assisted  Bathing  Assisted  Dressing  Assisted  Toileting  Assisted  Feeding  Assisted  Med Admin  Assisted  Med Delivery   whole    Wound Care Documentation and Therapy:        Elimination:  Continence:   · Bowel: Yes  · Bladder: Yes  Urinary Catheter: Removal date 3/23/2021  Colostomy/Ileostomy/Ileal Conduit: No       Date of Last BM: 3/22/21    Intake/Output Summary (Last 24 hours) at 3/23/2021 7530  Last data filed at 3/23/2021 0855  Gross per 24 hour   Intake 20 ml   Output 1300 ml   Net -1280 ml     I/O last 3 completed shifts:  In: -   Out: [address] [Urine:1225]    Safety Concerns: At Risk for Falls    Impairments/Disabilities:      None    Nutrition Therapy:  Current Nutrition Therapy:   - Oral Diet:  Cardiac and lactose controlled    Routes of Feeding: Oral  Liquids: No Restrictions  Daily Fluid Restriction: no  Last Modified Barium Swallow with Video (Video Swallowing Test): {Done Not Done [id number]}    Treatments at the Time of Hospital Discharge:   Respiratory Treatments: Albuterol and ipratropium  Oxygen Therapy:  is on oxygen at 6 L/min per nasal cannula.   Ventilator:    - BiPAP    ,   only when sleeping    Rehab Therapies: Physical Therapy and Occupational Therapy  Weight Bearing Status/Restrictions: No weight bearing restirctions  Other Medical Equipment (for information only, NOT a DME order):  wheelchair and walker  Other Treatments:     Patient's personal belongings (please select all that are sent with patient):  Cell phone, ipad, book, purse, ipad pillow    RN SIGNATURE:  Electronically signed by Latosha George RN on 3/23/21 at 10:43 AM EDT      PHYSICIAN SECTION    Prognosis: Good    Condition at Discharge: Stable    Rehab Potential (if transferring to Rehab): Fair    Recommended Labs or Other Treatments After Discharge:     Physician Certification: I certify the above information and transfer of Jodie Cantu  is necessary for the continuing treatment of the diagnosis listed and that she requires [place] for less 30 days.      Update Admission H&P: No change in H&P    PHYSICIAN SIGNATURE:  Electronically signed by Vikash Rodríguez MD on 3/23/21 at 11:08 AM EDT

## 2021-03-23 NOTE — DISCHARGE SUMMARY
amLODIPine (NORVASC) 5 MG tablet  Take 1 tablet by mouth daily             Ascorbic Acid (VITAMIN C) 500 MG tablet  Take 500 mg by mouth daily             brimonidine-timolol (COMBIGAN) 0.2-0.5 % ophthalmic solution  Place 1 drop into both eyes every 12 hours             calcitRIOL (ROCALTROL) 0.25 MCG capsule  Take 1 capsule by mouth daily             carvedilol (COREG) 12.5 MG tablet  Take 1 tablet by mouth 2 times daily (with meals)             Cholecalciferol (VITAMIN D-3) 1000 UNITS CAPS  Take 1,000 Units by mouth daily             cycloSPORINE (RESTASIS) 0.05 % ophthalmic emulsion  1 drop 2 times daily             gabapentin (NEURONTIN) 300 MG capsule  Take 1 capsule by mouth 3 times daily             lisinopril (PRINIVIL;ZESTRIL) 5 MG tablet  Take 1 tablet by mouth daily             LUMIGAN 0.01 % SOLN ophthalmic drops  Place 1 drop into both eyes nightly              Multiple Vitamins-Iron (DAILY MULTIVITAMINS/IRON PO)  Take 1 tablet by mouth daily             Multiple Vitamins-Minerals (THERAPEUTIC MULTIVITAMIN-MINERALS) tablet  Take 1 tablet by mouth daily             Omega-3 Fatty Acids (FISH OIL) 1000 MG CAPS  Take 1,000 mg by mouth daily             oxybutynin (DITROPAN XL) 5 MG extended release tablet  Take 1 tablet by mouth daily             QUEtiapine (SEROQUEL) 50 MG tablet  Take 0.5 tablets by mouth nightly             rivaroxaban (XARELTO) 10 MG TABS tablet  Take 1 tablet by mouth daily             sildenafil (REVATIO) 20 MG tablet  Take 0.5 tablets by mouth 2 times daily             SYMBICORT 160-4.5 MCG/ACT AERO  Inhale 2 puffs into the lungs 2 times daily             torsemide (DEMADEX) 20 MG tablet  Take 1 tablet by mouth daily             traMADol (ULTRAM) 50 MG tablet  Take 50 mg by mouth.             vitamin B-12 (CYANOCOBALAMIN) 500 MCG tablet  Take 500 mcg by mouth daily             vitamin D (ERGOCALCIFEROL) 1.25 MG (13032 UT) CAPS capsule  Take 1 capsule by mouth once a week Objective Findings at Discharge:   BP (!) 111/56   Pulse 72   Temp 98.5 °F (36.9 °C) (Oral)   Resp 19   Ht 5' 4.02\" (1.626 m)   Wt 243 lb 9.6 oz (110.5 kg)   SpO2 95%   BMI 41.79 kg/m²            PHYSICAL EXAM  GEN Awake female, sitting upright in bed in no apparent distress. Appears given age. EYES Pupils are equally round. No scleral erythema, discharge, or conjunctivitis. HENT Mucous membranes are moist. Oral pharynx without exudates, no evidence of thrush. NECK Supple, no apparent thyromegaly or masses. RESP Clear to auscultation, no wheezes, rales or rhonchi. Symmetric chest movement while on room air. CARDIO/VASC S1/S2 auscultated. Regular rate without appreciable murmurs, rubs, or gallops. No JVD or carotid bruits. Peripheral pulses equal bilaterally and palpable. No peripheral edema. GI Abdomen is soft without significant tenderness, masses, or guarding. Bowel sounds are normoactive. Rectal exam deferred.  No costovertebral angle tenderness. Normal appearing external genitalia. Castaneda catheter is not present. HEME/LYMPH No palpable cervical lymphadenopathy and no hepatosplenomegaly. No petechiae or ecchymoses. MSK No gross joint deformities. SKIN Normal coloration, warm, dry. NEURO Cranial nerves appear grossly intact, normal speech, no lateralizing weakness. PSYCH Awake, alert, oriented x 4. Affect appropriate.     BMP/CBC  Recent Labs     03/21/21  0327 03/22/21  0417   * 145   K 3.9 5.1    101   CO2 39* 38*   BUN 28* 32*   CREATININE 1.4* 1.5*       IMAGING:    Discharge Time of 37 minutes    Electronically signed by Neptali Dash MD on 3/23/2021 at 11:17 AM

## 2021-03-23 NOTE — CONSULTS
Pulmonary Consult Note      Reason for Consult: SOB  Requesting Physician: Dr. Clifford Stubbs  Subjective:   CHIEF COMPLAINT :SOB    Patient Active Problem List    Diagnosis Date Noted    SOB (shortness of breath) 03/17/2021    Mild obstructive sleep apnea 03/08/2021    Moderate COPD (chronic obstructive pulmonary disease) ([facility] 03/08/2021    Overflow incontinence of urine 01/19/2021    Chronic respiratory failure ([facility] [facility] 01/04/2021    Moderate to severe pulmonary hypertension ([facility] 01/04/2021    Stage 3a chronic kidney disease 10/12/2020    Secondary hyperpar[facility] 10/12/2020    Morbid obesity with BMI of 45.0-49.9, adult [facility] 09/17/2017     Overview Note:     Lose weight and follow diet.  Primary insomnia 05/09/2016     Overview Note:     Patient takes Seroquel 25 mg at hs, unable to d/c it      Urge incontinence of urine 11/17/2015     Overview Note:     Pt is using pads      Dry eye syndrome 11/17/2015     Overview Note:     Sees Dr Tang Jaimes. On restasis      Glaucoma 11/17/2015     Overview Note:     Sees ophthalmologist      Essential hypertension 10/13/2015     Overview Note:     Hypertension in control. Follow low salt diet. Continue current treatment. Pt is on carvedilol 25 mg bid, amlodipine and lasix      Pedal edema 10/13/2015     Overview Note:     Has chronic pedal edema takes Lasix for that  Could be lymphedema. Keep leg elevated and use compression stockings. Had physical therapy for that      Neuropathy of right lower extremity 10/13/2015     Overview Note:     Has pain in the right leg and tingling since right hip surgery several years ago. Patient is on gabapentin 300 mg 3 times a day      CKD (chronic kidney disease) 07/16/2015     Overview Note:     CK D is fluctuating. Creatinine now is down to 1.4. It was 2.0      Renal calculus, left      Overview Note:     Seen urologist in [place] Dr. Margaree Ahumada.   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 She  had ? urostomy and then had stent and  had lithotripsy. Sees him every year          HPI:                The patient is a 79 y.o. female with significant past medical history of Morbid obesity, YAJAIRA non compliant with CPAP, CAD, HTN  presents with complaints of SOB and Bilateral LE swelling for 1 week beofre admission. She had to stop taking even 4 steps. She is on  Lasix now and feels better. She is on oxygen at home 4 L/min. She presented to ED in the sats of 80%. She had a Right heart cath which showed that her mean PA pressure is 48 mmhg. She has h/o smoking 2 ks per day for about 156 years which she quit in 1988. Her PBNPT was elevatd at [phone] and now down to 2372. She had a PFT done on 01/13/21 and it showed restrictive lung disease sec to Obesity but her TLC is also decreased at 67% and also has severe decrease in [facility] of 37. She is on Symbicort and Albuterol prn  Past Medical History:      Diagnosis Date    Acute non Q wave MI (myocardial infarction), subsequent episode 7/15    probably demand ischemia. stress test was normal    Anemia     Arthritis     Diverticulitis     Dry eye syndrome 11/17/2015    Sees Dr James Onofre.  On restasis    Glaucoma     Nghia filter in place 2007    History of blood transfusion 2008    Hx of blood clots 2007    \"Right Leg\"  Nghia filter    HX OTHER MEDICAL 10-25-13 CT Chest With Contrast    Results Include Mildly Enlarged Mediastinal , Hilar Or Supraclavicular Lymph Nodes    Hypertension     Kidney stones     Obesity (BMI 35.0-39.9 without comorbidity)     Primary insomnia 5/9/2016    Patient takes Seroquel    Sepsis due to urinary tract infection ([facility] [facility] 2015    transferred to [facility] Thyroid disease     \"Took Thyroid Medications Years Ago\"    Urinary incontinence     Ventral hernia     \"Have Ventral Hernia Now, Found On CT Scan \"      Past Surgical History:        Procedure Laterality Date    BREAST BIOPSY  Unsure When     General Vinson Which Breast, Benign    BREAST SURGERY Right 1960    \"Infected Milk Gland\"    CHOLECYSTECTOMY  2001    Done During Gastric Bypass    COLONOSCOPY  2006    Diverticulitis    DENTAL SURGERY      Teeth Extracted In Past    FRACTURE SURGERY Right 2007    \"Right Femur Shattered\"    GASTRIC BYPASS SURGERY  2001    \"Lost About 125 Pounds, Gallbladder Also Taken Out\"    HERNIA REPAIR  2008    Abdominal Hernia Repair    HYSTERECTOMY, TOTAL ABDOMINAL  1994    JOINT REPLACEMENT Right 2007    Total Right Hip, \"Dr. Swathi Alexander Cut The Nerves During The Surgery\"    JOINT REPLACEMENT Left 5-08    Total Left Knee    JOINT REPLACEMENT Right 8-08    Total Right Knee    OTHER SURGICAL HISTORY  2007    Nghia Filter    OTHER SURGICAL HISTORY  2-08    \"Hardware Removed Right Femur\"    OTHER SURGICAL HISTORY Left [id number]    left percutaneous nephrostomy placed, left ureteral stent lalced,     TONSILLECTOMY AND ADENOIDECTOMY  1940's     Current Medications:     rivaroxaban  10 mg Oral Daily    torsemide  20 mg Oral Daily    sildenafil  10 mg Oral BID    lisinopril  5 mg Oral Daily    carvedilol  12.5 mg Oral BID WC    sodium chloride flush  10 mL Intravenous 2 times per day    calcitRIOL  0.25 mcg Oral Daily    gabapentin  300 mg Oral TID    latanoprost  1 drop Both Eyes Nightly    oxybutynin  5 mg Oral Daily    QUEtiapine  25 mg Oral Nightly    budesonide-formoterol  2 puff Inhalation BID    acyclovir  400 mg Oral BID    sodium chloride flush  10 mL Intravenous 2 times per day    brimonidine  1 drop Both Eyes BID    And    timolol  1 drop Both Eyes BID     Allergies:    Social History:    TOBACCO:   reports that she quit smoking about 32 years ago. She started smoking about 60 years ago. She has a 56.00 pack-year smoking history. She has never used smokeless tobacco.  ETOH:   reports no history of alcohol use.   Patient currently lives independently    Family History:       Problem Relation Age of Onset    Arthritis Mother     High Blood Pressure Mother    Opal Saunders Vision Loss Mother         \"Glaucoma\"    Diabetes Father     Vision Loss Father     High Blood Pressure Brother     High Cholesterol Brother     High Blood Pressure Brother     High Cholesterol Brother     High Blood Pressure Brother     High Cholesterol Brother     High Blood Pressure Brother     High Cholesterol Brother     Diabetes Brother     Other Brother     Vision Loss Brother         \"Glaucoma, Cataracts\"       REVIEW OF SYSTEMS:    CONSTITUTIONAL:  negative for fevers, chills, diaphoresis, activity change, appetite change, fatigue, night sweats and unexpected weight change. EYES:  negative for blurred vision, eye discharge, visual disturbance and icterus  HEENT:  negative for hearing loss, tinnitus, ear drainage, sinus pressure, nasal congestion, epistaxis and snoring  RESPIRATORY:  See HPI  CARDIOVASCULAR:  negative for chest pain, palpitations, exertional chest pressure/discomfort, edema, syncope  GASTROINTESTINAL:  negative for nausea, vomiting, diarrhea, constipation, blood in stool and abdominal pain  GENITOURINARY:  negative for frequency, dysuria, urinary incontinence, decreased urine volume, and hematuria  HEMATOLOGIC/LYMPHATIC:  negative for easy bruising, bleeding and lymphadenopathy  ALLERGIC/IMMUNOLOGIC:  negative for recurrent infections, angioedema, anaphylaxis and drug reactions  ENDOCRINE:  negative for weight changes and diabetic symptoms including polyuria, polydipsia and polyphagia  MUSCULOSKELETAL:  negative for  pain, joint swelling, decreased range of motion and muscle weakness  NEUROLOGICAL:  negative for headaches, slurred speech, unilateral weakness  PSYCHIATRIC/BEHAVIORAL: negative for hallucinations, behavioral problems, confusion and agitation.      Objective:   PHYSICAL EXAM:      VITALS:  BP (!) 111/56   Pulse 72   Temp 98.5 °F (36.9 °C) (Oral)   Resp 19   Ht 5' 4.02\" (1.626 m)   Wt 243 lb 9.6 oz (110.5 GFRAA 41 03/22/2021    LABGLOM 33 03/22/2021    GLUCOSE 86 03/22/2021     Hepatic Function Panel:    Lab Results   Component Value Date    ALKPHOS 55 03/17/2021    ALT 11 03/17/2021    AST 19 03/17/2021    PROT 6.6 03/17/2021    PROT 7.6 04/26/2012    BILITOT 1.1 03/17/2021     ABG:    Lab Results   Component Value Date    QAP3XYW 35.3 03/18/2021    HJD4IVV 57.0 03/18/2021    PO2ART 56 03/18/2021       Cultures:   Blood Culture:    Sputum Culture:        Radiology Review:    Nonspecific perihilar opacities.  Atelectasis, infectious or inflammatory   airway process, and early pulmonary edema are in the differential.  Correlate   with presentation to exclude underlying pneumonia     Moderate to severe emphysematous changes. 2. Dilation of the pulmonary trunk which could indicate pulmonary   hypertension.  Mild interlobular septal thickening could indicate   interstitial edema. 3. Dilation of the ascending thoracic aorta measuring up to 4.1 cm in   diameter. 4. Multiple enlarged right supraclavicular and cervical lymph nodes           Other diagnostic test:          Assessment/Plan       Patient Active Problem List    Diagnosis Date Noted    SOB (shortness of breath) 03/17/2021    Mild obstructive sleep apnea 03/08/2021    Moderate COPD (chronic obstructive pulmonary disease) ([address] 03/08/2021    Overflow incontinence of urine 01/19/2021    Chronic respiratory failure ([address] 01/04/2021    Moderate to severe pulmonary hypertension ([address] 01/04/2021    Stage 3a chronic kidney disease 10/12/2020    Secondary hyperparathyroidism of renal origin ([address] 10/12/2020    Morbid obesity with BMI of 45.0-49.9, adult ([address] 09/17/2017     Overview Note:     Lose weight and follow diet.       Primary insomnia 05/09/2016     Overview Note:     Patient takes Seroquel 25 mg at hs, unable to d/c it      Urge incontinence of urine 11/17/2015     Overview Note:     Pt is using pads      Dry eye syndrome 11/17/2015     Overview Note:     Sees Dr Erick Alanis. On restasis      Glaucoma 11/17/2015     Overview Note:     Sees ophthalmologist      Essential hypertension 10/13/2015     Overview Note:     Hypertension in control. Follow low salt diet. Continue current treatment. Pt is on carvedilol 25 mg bid, amlodipine and lasix      Pedal edema 10/13/2015     Overview Note:     Has chronic pedal edema takes Lasix for that  Could be lymphedema. Keep leg elevated and use compression stockings. Had physical therapy for that      Neuropathy of right lower extremity 10/13/2015     Overview Note:     Has pain in the right leg and tingling since right hip surgery several years ago. Patient is on gabapentin 300 mg 3 times a day      CKD (chronic kidney disease) 07/16/2015     Overview Note:     CK D is fluctuating. Creatinine now is down to 1.4. It was 2.0      Renal calculus, left      Overview Note:     Seen urologist in [place] Dr. Emery Potts. 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 She  had ? urostomy and then had stent and  had lithotripsy. Sees him every year     Acute on chronic hypoxic hypercapneic resp failure  Chronic Metabolic alkalosis  Moderate Pulmonary HTN likely sec to the YAJAIRA, COPD and Valvular heart disease    Morbid Obesity  YAJAIRA  Non compliant with the CPAP  COPD  Ex smoker  HTN  Pedal edema    PLAN  1. BIPAP  2. Lasix  3. Inhalers  4. Keep sats > 92%  5. ICS  6. OOB  7.  PT/OT  8. OP follow up  9.c/w present management            Electronically signed by Elvin Cortez MD on 3/23/2021 at 11:27 AM

## 2021-03-24 ENCOUNTER — HOSPITAL ENCOUNTER (OUTPATIENT)
Age: 81
Setting detail: SPECIMEN
Discharge: HOME | End: 2021-03-24

## 2021-03-24 LAB
ALBUMIN SERPL-MCNC: 3.3 GM/DL (ref 3.4–5)
ALP BLD-CCNC: 56 IU/L (ref 40–129)
ALT SERPL-CCNC: 11 U/L (ref 10–40)
ANION GAP SERPL CALCULATED.3IONS-SCNC: 15 MMOL/L (ref 4–16)
AST SERPL-CCNC: 28 IU/L (ref 15–37)
BILIRUB SERPL-MCNC: 1.1 MG/DL (ref 0–1)
BUN BLDV-MCNC: 44 MG/DL (ref 6–23)
CALCIUM SERPL-MCNC: 9.3 MG/DL (ref 8.3–10.6)
CHLORIDE BLD-SCNC: 95 MMOL/L (ref 99–110)
CO2: 28 MMOL/L (ref 21–32)
CREAT SERPL-MCNC: 1.5 MG/DL (ref 0.6–1.1)
GFR AFRICAN AMERICAN: 41 ML/MIN/1.73M2
GFR NON-AFRICAN AMERICAN: 33 ML/MIN/1.73M2
GLUCOSE BLD-MCNC: 47 MG/DL (ref 70–99)
HCT VFR BLD CALC: 52.2 % (ref 37–47)
HEMOGLOBIN: 15.4 GM/DL (ref 12.5–16)
MCH RBC QN AUTO: 32.6 PG (ref 27–31)
MCHC RBC AUTO-ENTMCNC: 29.5 % (ref 32–36)
MCV RBC AUTO: 110.6 FL (ref 78–100)
PDW BLD-RTO: 12.8 % (ref 11.7–14.9)
PLATELET # BLD: 131 K/CU MM (ref 140–440)
PMV BLD AUTO: 10.7 FL (ref 7.5–11.1)
POTASSIUM SERPL-SCNC: 5.9 MMOL/L (ref 3.5–5.1)
RBC # BLD: 4.72 M/CU MM (ref 4.2–5.4)
SODIUM BLD-SCNC: 138 MMOL/L (ref 135–145)
TOTAL PROTEIN: 5.9 GM/DL (ref 6.4–8.2)
WBC # BLD: 5.4 K/CU MM (ref 4–10.5)

## 2021-03-24 PROCEDURE — 85027 COMPLETE CBC AUTOMATED: CPT

## 2021-03-24 PROCEDURE — 36415 COLL VENOUS BLD VENIPUNCTURE: CPT

## 2021-03-24 PROCEDURE — 80053 COMPREHEN METABOLIC PANEL: CPT

## 2021-03-26 ENCOUNTER — HOSPITAL ENCOUNTER (OUTPATIENT)
Age: 81
Setting detail: SPECIMEN
Discharge: HOME | End: 2021-03-26

## 2021-03-26 LAB
ALBUMIN SERPL-MCNC: 3.6 GM/DL (ref 3.4–5)
ALP BLD-CCNC: 64 IU/L (ref 40–128)
ALT SERPL-CCNC: 13 U/L (ref 10–40)
ANION GAP SERPL CALCULATED.3IONS-SCNC: 11 MMOL/L (ref 4–16)
AST SERPL-CCNC: 21 IU/L (ref 15–37)
BILIRUB SERPL-MCNC: 0.7 MG/DL (ref 0–1)
BUN BLDV-MCNC: 60 MG/DL (ref 6–23)
CALCIUM SERPL-MCNC: 9.2 MG/DL (ref 8.3–10.6)
CHLORIDE BLD-SCNC: 96 MMOL/L (ref 99–110)
CO2: 36 MMOL/L (ref 21–32)
CREAT SERPL-MCNC: 2.1 MG/DL (ref 0.6–1.1)
GFR AFRICAN AMERICAN: 27 ML/MIN/1.73M2
GFR NON-AFRICAN AMERICAN: 23 ML/MIN/1.73M2
GLUCOSE BLD-MCNC: 75 MG/DL (ref 70–99)
POTASSIUM SERPL-SCNC: 4.6 MMOL/L (ref 3.5–5.1)
SODIUM BLD-SCNC: 143 MMOL/L (ref 135–145)
TOTAL PROTEIN: 6.2 GM/DL (ref 6.4–8.2)

## 2021-03-26 PROCEDURE — 36415 COLL VENOUS BLD VENIPUNCTURE: CPT

## 2021-03-26 PROCEDURE — 80053 COMPREHEN METABOLIC PANEL: CPT

## 2021-03-29 ENCOUNTER — HOSPITAL ENCOUNTER (OUTPATIENT)
Age: 81
Setting detail: SPECIMEN
Discharge: HOME | End: 2021-03-29

## 2021-03-29 LAB
ALBUMIN SERPL-MCNC: 3.4 GM/DL (ref 3.4–5)
ALP BLD-CCNC: 61 IU/L (ref 40–128)
ALT SERPL-CCNC: 10 U/L (ref 10–40)
ANION GAP SERPL CALCULATED.3IONS-SCNC: 10 MMOL/L (ref 4–16)
AST SERPL-CCNC: 14 IU/L (ref 15–37)
BILIRUB SERPL-MCNC: 0.5 MG/DL (ref 0–1)
BUN BLDV-MCNC: 70 MG/DL (ref 6–23)
CALCIUM SERPL-MCNC: 8.9 MG/DL (ref 8.3–10.6)
CHLORIDE BLD-SCNC: 97 MMOL/L (ref 99–110)
CO2: 34 MMOL/L (ref 21–32)
CREAT SERPL-MCNC: 2.2 MG/DL (ref 0.6–1.1)
GFR AFRICAN AMERICAN: 26 ML/MIN/1.73M2
GFR NON-AFRICAN AMERICAN: 22 ML/MIN/1.73M2
GLUCOSE BLD-MCNC: 64 MG/DL (ref 70–99)
HCT VFR BLD CALC: 44.8 % (ref 37–47)
HEMOGLOBIN: 14 GM/DL (ref 12.5–16)
MCH RBC QN AUTO: 33.1 PG (ref 27–31)
MCHC RBC AUTO-ENTMCNC: 31.3 % (ref 32–36)
MCV RBC AUTO: 105.9 FL (ref 78–100)
PDW BLD-RTO: 12.7 % (ref 11.7–14.9)
PLATELET # BLD: 185 K/CU MM (ref 140–440)
PMV BLD AUTO: 11.5 FL (ref 7.5–11.1)
POTASSIUM SERPL-SCNC: 4.9 MMOL/L (ref 3.5–5.1)
RBC # BLD: 4.23 M/CU MM (ref 4.2–5.4)
SODIUM BLD-SCNC: 141 MMOL/L (ref 135–145)
TOTAL PROTEIN: 5.7 GM/DL (ref 6.4–8.2)
WBC # BLD: 5.6 K/CU MM (ref 4–10.5)

## 2021-03-29 PROCEDURE — 36415 COLL VENOUS BLD VENIPUNCTURE: CPT

## 2021-03-29 PROCEDURE — 85027 COMPLETE CBC AUTOMATED: CPT

## 2021-03-29 PROCEDURE — 80053 COMPREHEN METABOLIC PANEL: CPT

## 2021-04-02 ENCOUNTER — HOSPITAL ENCOUNTER (OUTPATIENT)
Age: 81
Setting detail: SPECIMEN
Discharge: HOME | End: 2021-04-02

## 2021-04-02 LAB
HCT VFR BLD CALC: 47.1 % (ref 37–47)
HEMOGLOBIN: 14.3 GM/DL (ref 12.5–16)
MCH RBC QN AUTO: 32.6 PG (ref 27–31)
MCHC RBC AUTO-ENTMCNC: 30.4 % (ref 32–36)
MCV RBC AUTO: 107.3 FL (ref 78–100)
PDW BLD-RTO: 12.5 % (ref 11.7–14.9)
PLATELET # BLD: 203 K/CU MM (ref 140–440)
PMV BLD AUTO: 10.6 FL (ref 7.5–11.1)
RBC # BLD: 4.39 M/CU MM (ref 4.2–5.4)
WBC # BLD: 4.7 K/CU MM (ref 4–10.5)

## 2021-04-02 PROCEDURE — 36415 COLL VENOUS BLD VENIPUNCTURE: CPT

## 2021-04-02 PROCEDURE — 80053 COMPREHEN METABOLIC PANEL: CPT

## 2021-04-02 PROCEDURE — 85027 COMPLETE CBC AUTOMATED: CPT

## 2021-04-03 ENCOUNTER — HOSPITAL ENCOUNTER (OUTPATIENT)
Age: 81
Setting detail: SPECIMEN
Discharge: HOME | End: 2021-04-03

## 2021-04-05 ENCOUNTER — HOSPITAL ENCOUNTER (OUTPATIENT)
Age: 81
Setting detail: SPECIMEN
Discharge: HOME | End: 2021-04-05

## 2021-04-05 LAB
ALBUMIN SERPL-MCNC: 3.3 GM/DL (ref 3.4–5)
ALP BLD-CCNC: 63 IU/L (ref 40–128)
ALT SERPL-CCNC: 9 U/L (ref 10–40)
ANION GAP SERPL CALCULATED.3IONS-SCNC: 11 MMOL/L (ref 4–16)
AST SERPL-CCNC: 15 IU/L (ref 15–37)
BILIRUB SERPL-MCNC: 0.5 MG/DL (ref 0–1)
BUN BLDV-MCNC: 41 MG/DL (ref 6–23)
CALCIUM SERPL-MCNC: 8.9 MG/DL (ref 8.3–10.6)
CHLORIDE BLD-SCNC: 104 MMOL/L (ref 99–110)
CO2: 29 MMOL/L (ref 21–32)
CREAT SERPL-MCNC: 1.4 MG/DL (ref 0.6–1.1)
GFR AFRICAN AMERICAN: 44 ML/MIN/1.73M2
GFR NON-AFRICAN AMERICAN: 36 ML/MIN/1.73M2
GLUCOSE BLD-MCNC: 62 MG/DL (ref 70–99)
HCT VFR BLD CALC: 45.2 % (ref 37–47)
HEMOGLOBIN: 13.9 GM/DL (ref 12.5–16)
MCH RBC QN AUTO: 32.2 PG (ref 27–31)
MCHC RBC AUTO-ENTMCNC: 30.8 % (ref 32–36)
MCV RBC AUTO: 104.6 FL (ref 78–100)
PDW BLD-RTO: 12.5 % (ref 11.7–14.9)
PLATELET # BLD: 188 K/CU MM (ref 140–440)
PMV BLD AUTO: 10.6 FL (ref 7.5–11.1)
POTASSIUM SERPL-SCNC: 4.4 MMOL/L (ref 3.5–5.1)
RBC # BLD: 4.32 M/CU MM (ref 4.2–5.4)
SODIUM BLD-SCNC: 144 MMOL/L (ref 135–145)
TOTAL PROTEIN: 5.7 GM/DL (ref 6.4–8.2)
WBC # BLD: 4.5 K/CU MM (ref 4–10.5)

## 2021-04-05 PROCEDURE — 85027 COMPLETE CBC AUTOMATED: CPT

## 2021-04-05 PROCEDURE — 80053 COMPREHEN METABOLIC PANEL: CPT

## 2021-04-05 PROCEDURE — 36415 COLL VENOUS BLD VENIPUNCTURE: CPT

## 2021-04-08 ENCOUNTER — HOSPITAL ENCOUNTER (OUTPATIENT)
Age: 81
Setting detail: SPECIMEN
Discharge: HOME | End: 2021-04-08
Payer: MEDICARE

## 2021-04-08 LAB
ALBUMIN SERPL-MCNC: 3.3 GM/DL (ref 3.4–5)
ANION GAP SERPL CALCULATED.3IONS-SCNC: 11 MMOL/L (ref 4–16)
BUN BLDV-MCNC: 39 MG/DL (ref 6–23)
CALCIUM IONIZED: 4.2 MG/DL (ref 4.48–5.28)
CALCIUM SERPL-MCNC: 8.6 MG/DL (ref 8.3–10.6)
CHLORIDE BLD-SCNC: 101 MMOL/L (ref 99–110)
CO2: 27 MMOL/L (ref 21–32)
CREAT SERPL-MCNC: 1.4 MG/DL (ref 0.6–1.1)
GFR AFRICAN AMERICAN: 44 ML/MIN/1.73M2
GFR NON-AFRICAN AMERICAN: 36 ML/MIN/1.73M2
GLUCOSE BLD-MCNC: 38 MG/DL (ref 70–99)
IONIZED CA: 1.05 MMOL/L (ref 1.12–1.32)
PHOSPHORUS: 3.6 MG/DL (ref 2.5–4.9)
POTASSIUM SERPL-SCNC: 4.5 MMOL/L (ref 3.5–5.1)
SODIUM BLD-SCNC: 139 MMOL/L (ref 135–145)

## 2021-04-08 PROCEDURE — 36415 COLL VENOUS BLD VENIPUNCTURE: CPT

## 2021-04-08 PROCEDURE — 80069 RENAL FUNCTION PANEL: CPT

## 2021-04-08 PROCEDURE — 83970 ASSAY OF PARATHORMONE: CPT

## 2021-04-08 PROCEDURE — 82330 ASSAY OF CALCIUM: CPT

## 2021-04-10 LAB — PARATHYROID HORMONE INTACT: 75 PG/ML (ref 15–65)

## 2021-07-06 ENCOUNTER — APPOINTMENT (OUTPATIENT)
Dept: GENERAL RADIOLOGY | Age: 81
End: 2021-07-06
Payer: MEDICARE

## 2021-07-06 ENCOUNTER — HOSPITAL ENCOUNTER (EMERGENCY)
Age: 81
Discharge: ANOTHER HOSPITAL | End: 2021-07-06
Attending: EMERGENCY MEDICINE
Payer: MEDICARE

## 2021-07-06 VITALS
SYSTOLIC BLOOD PRESSURE: 94 MMHG | HEIGHT: 64 IN | RESPIRATION RATE: 18 BRPM | DIASTOLIC BLOOD PRESSURE: 69 MMHG | HEART RATE: 87 BPM | WEIGHT: 230 LBS | BODY MASS INDEX: 39.27 KG/M2 | OXYGEN SATURATION: 99 % | TEMPERATURE: 97.7 F

## 2021-07-06 DIAGNOSIS — S72.92XA CLOSED FRACTURE OF LEFT FEMUR, UNSPECIFIED FRACTURE MORPHOLOGY, UNSPECIFIED PORTION OF FEMUR, INITIAL ENCOUNTER (HCC): Primary | ICD-10-CM

## 2021-07-06 LAB
ALBUMIN SERPL-MCNC: 3.8 GM/DL (ref 3.4–5)
ALP BLD-CCNC: 55 IU/L (ref 40–129)
ALT SERPL-CCNC: 10 U/L (ref 10–40)
ANION GAP SERPL CALCULATED.3IONS-SCNC: 14 MMOL/L (ref 4–16)
AST SERPL-CCNC: 16 IU/L (ref 15–37)
BASOPHILS ABSOLUTE: 0 K/CU MM
BASOPHILS RELATIVE PERCENT: 0.2 % (ref 0–1)
BILIRUB SERPL-MCNC: 0.9 MG/DL (ref 0–1)
BUN BLDV-MCNC: 53 MG/DL (ref 6–23)
CALCIUM SERPL-MCNC: 9.1 MG/DL (ref 8.3–10.6)
CHLORIDE BLD-SCNC: 102 MMOL/L (ref 99–110)
CO2: 21 MMOL/L (ref 21–32)
CREAT SERPL-MCNC: 1.8 MG/DL (ref 0.6–1.1)
DIFFERENTIAL TYPE: ABNORMAL
EOSINOPHILS ABSOLUTE: 0.1 K/CU MM
EOSINOPHILS RELATIVE PERCENT: 0.7 % (ref 0–3)
GFR AFRICAN AMERICAN: 33 ML/MIN/1.73M2
GFR NON-AFRICAN AMERICAN: 27 ML/MIN/1.73M2
GLUCOSE BLD-MCNC: 123 MG/DL (ref 70–99)
HCT VFR BLD CALC: 41.7 % (ref 37–47)
HEMOGLOBIN: 13.5 GM/DL (ref 12.5–16)
IMMATURE NEUTROPHIL %: 0.3 % (ref 0–0.43)
LYMPHOCYTES ABSOLUTE: 0.9 K/CU MM
LYMPHOCYTES RELATIVE PERCENT: 6.8 % (ref 24–44)
MCH RBC QN AUTO: 33.7 PG (ref 27–31)
MCHC RBC AUTO-ENTMCNC: 32.4 % (ref 32–36)
MCV RBC AUTO: 104 FL (ref 78–100)
MONOCYTES ABSOLUTE: 0.6 K/CU MM
MONOCYTES RELATIVE PERCENT: 4.4 % (ref 0–4)
NUCLEATED RBC %: 0 %
PDW BLD-RTO: 13.2 % (ref 11.7–14.9)
PLATELET # BLD: 154 K/CU MM (ref 140–440)
PMV BLD AUTO: 10.1 FL (ref 7.5–11.1)
POTASSIUM SERPL-SCNC: 4.5 MMOL/L (ref 3.5–5.1)
RBC # BLD: 4.01 M/CU MM (ref 4.2–5.4)
SEGMENTED NEUTROPHILS ABSOLUTE COUNT: 11.1 K/CU MM
SEGMENTED NEUTROPHILS RELATIVE PERCENT: 87.6 % (ref 36–66)
SODIUM BLD-SCNC: 137 MMOL/L (ref 135–145)
TOTAL IMMATURE NEUTOROPHIL: 0.04 K/CU MM
TOTAL NUCLEATED RBC: 0 K/CU MM
TOTAL PROTEIN: 6.4 GM/DL (ref 6.4–8.2)
WBC # BLD: 12.7 K/CU MM (ref 4–10.5)

## 2021-07-06 PROCEDURE — 6360000002 HC RX W HCPCS: Performed by: EMERGENCY MEDICINE

## 2021-07-06 PROCEDURE — 51702 INSERT TEMP BLADDER CATH: CPT

## 2021-07-06 PROCEDURE — 73562 X-RAY EXAM OF KNEE 3: CPT

## 2021-07-06 PROCEDURE — 73590 X-RAY EXAM OF LOWER LEG: CPT

## 2021-07-06 PROCEDURE — 6370000000 HC RX 637 (ALT 250 FOR IP): Performed by: EMERGENCY MEDICINE

## 2021-07-06 PROCEDURE — 99285 EMERGENCY DEPT VISIT HI MDM: CPT

## 2021-07-06 PROCEDURE — 96361 HYDRATE IV INFUSION ADD-ON: CPT

## 2021-07-06 PROCEDURE — 36415 COLL VENOUS BLD VENIPUNCTURE: CPT

## 2021-07-06 PROCEDURE — 96375 TX/PRO/DX INJ NEW DRUG ADDON: CPT

## 2021-07-06 PROCEDURE — 85025 COMPLETE CBC W/AUTO DIFF WBC: CPT

## 2021-07-06 PROCEDURE — 71045 X-RAY EXAM CHEST 1 VIEW: CPT

## 2021-07-06 PROCEDURE — 80053 COMPREHEN METABOLIC PANEL: CPT

## 2021-07-06 PROCEDURE — 96374 THER/PROPH/DIAG INJ IV PUSH: CPT

## 2021-07-06 PROCEDURE — 2580000003 HC RX 258: Performed by: EMERGENCY MEDICINE

## 2021-07-06 PROCEDURE — 72170 X-RAY EXAM OF PELVIS: CPT

## 2021-07-06 RX ORDER — OXYCODONE HYDROCHLORIDE AND ACETAMINOPHEN 5; 325 MG/1; MG/1
1 TABLET ORAL ONCE
Status: COMPLETED | OUTPATIENT
Start: 2021-07-06 | End: 2021-07-06

## 2021-07-06 RX ORDER — 0.9 % SODIUM CHLORIDE 0.9 %
500 INTRAVENOUS SOLUTION INTRAVENOUS ONCE
Status: COMPLETED | OUTPATIENT
Start: 2021-07-06 | End: 2021-07-06

## 2021-07-06 RX ORDER — MORPHINE SULFATE 4 MG/ML
4 INJECTION, SOLUTION INTRAMUSCULAR; INTRAVENOUS ONCE
Status: COMPLETED | OUTPATIENT
Start: 2021-07-06 | End: 2021-07-06

## 2021-07-06 RX ORDER — HYDROCODONE BITARTRATE AND ACETAMINOPHEN 5; 325 MG/1; MG/1
1 TABLET ORAL ONCE
Status: COMPLETED | OUTPATIENT
Start: 2021-07-06 | End: 2021-07-06

## 2021-07-06 RX ORDER — FENTANYL CITRATE 50 UG/ML
50 INJECTION, SOLUTION INTRAMUSCULAR; INTRAVENOUS ONCE
Status: COMPLETED | OUTPATIENT
Start: 2021-07-06 | End: 2021-07-06

## 2021-07-06 RX ADMIN — HYDROCODONE BITARTRATE AND ACETAMINOPHEN 1 TABLET: 5; 325 TABLET ORAL at 15:56

## 2021-07-06 RX ADMIN — FENTANYL CITRATE 50 MCG: 50 INJECTION, SOLUTION INTRAMUSCULAR; INTRAVENOUS at 18:09

## 2021-07-06 RX ADMIN — MORPHINE SULFATE 4 MG: 4 INJECTION, SOLUTION INTRAMUSCULAR; INTRAVENOUS at 21:44

## 2021-07-06 RX ADMIN — SODIUM CHLORIDE 500 ML: 9 INJECTION, SOLUTION INTRAVENOUS at 19:23

## 2021-07-06 RX ADMIN — OXYCODONE HYDROCHLORIDE AND ACETAMINOPHEN 1 TABLET: 5; 325 TABLET ORAL at 19:04

## 2021-07-06 ASSESSMENT — ENCOUNTER SYMPTOMS
NAUSEA: 0
EYE PAIN: 0
ABDOMINAL PAIN: 0
BACK PAIN: 0
VOMITING: 0
SHORTNESS OF BREATH: 0
EYE DISCHARGE: 0
SORE THROAT: 0
RHINORRHEA: 0
COUGH: 0

## 2021-07-06 ASSESSMENT — PAIN SCALES - GENERAL
PAINLEVEL_OUTOF10: 8
PAINLEVEL_OUTOF10: 10
PAINLEVEL_OUTOF10: 9
PAINLEVEL_OUTOF10: 10
PAINLEVEL_OUTOF10: 10

## 2021-07-06 ASSESSMENT — PAIN DESCRIPTION - PAIN TYPE: TYPE: ACUTE PAIN

## 2021-07-06 NOTE — ED PROVIDER NOTES
[address] ENCOUNTER      Pt Name: Kevin Rhodes  MRN: [id number]  [place] 1940  Date of evaluation: 7/6/2021  Provider: Braulio Colby MD    CHIEF COMPLAINT       Chief Complaint   Patient presents with   Mark Patella in parking lot and hurt left leg         HISTORY OF PRESENT ILLNESS      Kevin Rhodes is a 79 y.o. female who presents to the emergency department  for   Chief Complaint   Patient presents with   Mark Patella in parking lot and hurt left leg       72-year-old female presents for evaluation of left leg pain after mechanical fall. She has a history of multiple orthopedic surgeries on her right leg. She endorses that her left leg is typically her \"good leg. \"  She states her legs do frequently give out on her. She uses a walker at baseline. She reports that she was walking today with no obstructions in her path when she had a mechanical fall. She states that she \"crumpled to the ground. \"  No head or neck injury. No loss of consciousness. She is not anticoagulated. She has not ambulated since the fall. EMS was called to scene. She has not ambulated since the fall. She presents complaining of pain diffusely in her left leg. Denies any chest pain or abdominal pain. No shortness of breath. GCS of 15. She is moving all extremities spontaneously. She does not identify any exacerbating or alleviating factors. Nursing Notes, Triage Notes & Vital Signs were reviewed. REVIEW OF SYSTEMS    (2-9 systems for level 4, 10 or more for level 5)     Review of Systems   Constitutional: Negative for chills and fever. HENT: Negative for congestion, rhinorrhea and sore throat. Eyes: Negative for pain and discharge. Respiratory: Negative for cough and shortness of breath. Cardiovascular: Negative for chest pain and palpitations.    Gastrointestinal: Negative for abdominal pain, nausea and vomiting. Endocrine: Negative for polydipsia and polyuria. Genitourinary: Negative for dysuria and flank pain. Musculoskeletal: Negative for back pain and neck pain. Left leg pain   Skin: Negative for pallor and wound. Neurological: Negative for dizziness, facial asymmetry, light-headedness, numbness and headaches. Psychiatric/Behavioral: Negative for confusion. Except as noted above the remainder of the review of systems was reviewed and negative. PAST MEDICAL HISTORY     Past Medical History:   Diagnosis Date    Acute non Q wave MI (myocardial infarction), subsequent episode 7/15    probably demand ischemia. stress test was normal    Anemia     Arthritis     Diverticulitis     Dry eye syndrome 11/17/2015    Sees Dr Wilma Reich. On restasis    Glaucoma     Nghia filter in place 2007    History of blood transfusion 2008    Hx of blood clots 2007    \"Right Leg\"  Nghia filter    HX OTHER MEDICAL 10-25-13 CT Chest With Contrast    Results Include Mildly Enlarged Mediastinal , Hilar Or Supraclavicular Lymph Nodes    Hypertension     Kidney stones     Obesity (BMI 35.0-39.9 without comorbidity)     Primary insomnia 5/9/2016    Patient takes Seroquel    Sepsis due to urinary tract infection ([address] 2015    transferred to [facility] Thyroid disease     \"Took Thyroid Medications Years Ago\"    Urinary incontinence     Ventral hernia     \"Have Ventral Hernia Now, Found On CT Scan \"       Prior to Admission medications    Medication Sig Start Date End Date Taking?  Authorizing Provider   carvedilol (COREG) 25 MG tablet Take 1 tablet by mouth 2 times daily (with meals) 4/21/21   Rocky Frazier DO   torsemide (DEMADEX) 20 MG tablet Take 1 tablet by mouth daily 3/24/21   Libra Mendez MD   lisinopril (PRINIVIL;ZESTRIL) 5 MG tablet Take 1 tablet by mouth daily 3/24/21   Libra Mendez MD   Multiple Vitamins-Iron (DAILY MULTIVITAMINS/IRON PO) Take 1 tablet by mouth daily    Historical Provider, MD   SYMBICORT 160-4.5 MCG/ACT AERO Inhale 2 puffs into the lungs 2 times daily 3/8/21   Abdulkadir Cownay MD   calcitRIOL (ROCALTROL) 0.25 MCG capsule Take 1 capsule by mouth daily 1/19/21   Rocky Frazier DO   traMADol (ULTRAM) 50 MG tablet Take 50 mg by mouth.     Historical Provider, MD   Omega-3 Fatty Acids (FISH OIL) 1000 MG CAPS Take 1,000 mg by mouth daily    Historical Provider, MD   vitamin B-12 (CYANOCOBALAMIN) 500 MCG tablet Take 500 mcg by mouth daily    Historical Provider, MD   QUEtiapine (SEROQUEL) 50 MG tablet Take 0.5 tablets by mouth nightly 11/28/17   Kyle Mora MD   gabapentin (NEURONTIN) 300 MG capsule Take 1 capsule by mouth 3 times daily 11/28/17   Kyle Mora MD   LUMIGAN 0.01 % SOLN ophthalmic drops Place 1 drop into both eyes nightly  9/9/16   Historical Provider, MD   Cholecalciferol (VITAMIN D-3) 1000 UNITS CAPS Take 1,000 Units by mouth daily    Historical Provider, MD   Multiple Vitamins-Minerals (THERAPEUTIC MULTIVITAMIN-MINERALS) tablet Take 1 tablet by mouth daily    Historical Provider, MD   Ascorbic Acid (VITAMIN C) 500 MG tablet Take 500 mg by mouth daily    Historical Provider, MD   acyclovir (ZOVIRAX) 400 MG tablet Take 400 mg by mouth 2 times daily    Historical Provider, MD   brimonidine-timolol (COMBIGAN) 0.2-0.5 % ophthalmic solution Place 1 drop into both eyes every 12 hours    Historical Provider, MD   cycloSPORINE (RESTASIS) 0.05 % ophthalmic emulsion 1 drop 2 times daily    Historical Provider, MD   acetaminophen (TYLENOL) 500 MG tablet Take 500 mg by mouth every 6 hours as needed for Pain    Historical Provider, MD        Patient Active Problem List   Diagnosis    Renal calculus, left    CKD (chronic kidney disease)    Essential hypertension    Pedal edema    Neuropathy of right lower extremity    Urge incontinence of urine    Dry eye syndrome    Glaucoma    Primary insomnia    Morbid obesity with BMI of 45.0-49.9, CARVEDILOL (COREG) 25 MG TABLET    Take 1 tablet by mouth 2 times daily (with meals)    CHOLECALCIFEROL (VITAMIN D-3) 1000 UNITS CAPS    Take 1,000 Units by mouth daily    CYCLOSPORINE (RESTASIS) 0.05 % OPHTHALMIC EMULSION    1 drop 2 times daily    GABAPENTIN (NEURONTIN) 300 MG CAPSULE    Take 1 capsule by mouth 3 times daily    LISINOPRIL (PRINIVIL;ZESTRIL) 5 MG TABLET    Take 1 tablet by mouth daily    LUMIGAN 0.01 % SOLN OPHTHALMIC DROPS    Place 1 drop into both eyes nightly     MULTIPLE VITAMINS-IRON (DAILY MULTIVITAMINS/IRON PO)    Take 1 tablet by mouth daily    MULTIPLE VITAMINS-MINERALS (THERAPEUTIC MULTIVITAMIN-MINERALS) TABLET    Take 1 tablet by mouth daily    OMEGA-3 FATTY ACIDS (FISH OIL) 1000 MG CAPS    Take 1,000 mg by mouth daily    QUETIAPINE (SEROQUEL) 50 MG TABLET    Take 0.5 tablets by mouth nightly    SYMBICORT 160-4.5 MCG/ACT AERO    Inhale 2 puffs into the lungs 2 times daily    TORSEMIDE (DEMADEX) 20 MG TABLET    Take 1 tablet by mouth daily    TRAMADOL (ULTRAM) 50 MG TABLET    Take 50 mg by mouth. VITAMIN B-12 (CYANOCOBALAMIN) 500 MCG TABLET    Take 500 mcg by mouth daily       ALLERGIES     Diamox [acetazolamide] and Lactose intolerance (gi)    FAMILY HISTORY       Family History   Problem Relation Age of Onset    Arthritis Mother     High Blood Pressure Mother     Vision Loss Mother         \"Glaucoma\"    Diabetes Father     Vision Loss Father     High Blood Pressure Brother     High Cholesterol Brother     High Blood Pressure Brother     High Cholesterol Brother     High Blood Pressure Brother     High Cholesterol Brother     High Blood Pressure Brother     High Cholesterol Brother     Diabetes Brother     Other Brother     Vision Loss Brother         \"Glaucoma, Cataracts\"          SOCIAL HISTORY       Social History     Socioeconomic History    Marital status:       Spouse name: None    Number of children: None    Years of education: None    Highest education level: None   Occupational History    None   Tobacco Use    Smoking status: Former Smoker     Packs/day: 2.00     Years: 28.00     Pack years: 56.00     Start date: 11/15/1960     Quit date: 11/15/1988     Years since quittin.6    Smokeless tobacco: Never Used   Substance and Sexual Activity    Alcohol use: No     Alcohol/week: 0.0 standard drinks     Comment: \"Very Rarely\"    Drug use: No    Sexual activity: Not Currently     Partners: Male   Other Topics Concern    None   Social History Narrative    None     Social Determinants of Health     Financial Resource Strain:     Difficulty of Paying Living Expenses:    Food Insecurity:     Worried About Running Out of Food in the Last Year:     Ran Out of Food in the Last Year:    Transportation Needs:     Lack of Transportation (Medical):  Lack of Transportation (Non-Medical):    Physical Activity:     Days of Exercise per Week:     Minutes of Exercise per Session:    Stress:     Feeling of Stress :    Social Connections:     Frequency of Communication with Friends and Family:     Frequency of Social Gatherings with Friends and Family:     Attends Synagogue Services:     Active Member of Clubs or Organizations:     Attends Club or Organization Meetings:     Marital Status:    Intimate Partner Violence:     Fear of Current or Ex-Partner:     Emotionally Abused:     Physically Abused:     Sexually Abused:        SCREENINGS    Melba Coma Scale  Eye Opening: Spontaneous  Best Verbal Response: Oriented  Best Motor Response: Obeys commands  William Coma Scale Score: 15          PHYSICAL EXAM    (up to 7 for level 4, 8 or more for level 5)     ED Triage Vitals   BP Temp Temp src Pulse Resp SpO2 Height Weight   -- -- -- -- -- -- -- --       Physical Exam  Vitals reviewed. Constitutional:       Appearance: She is not ill-appearing or toxic-appearing. HENT:      Head: Normocephalic and atraumatic.       Nose: No congestion or rhinorrhea. Mouth/Throat:      Mouth: Mucous membranes are moist.      Pharynx: No oropharyngeal exudate or posterior oropharyngeal erythema. Eyes:      General:         Right eye: No discharge. Left eye: No discharge. Extraocular Movements: Extraocular movements intact. Pupils: Pupils are equal, round, and reactive to light. Cardiovascular:      Rate and Rhythm: Normal rate. Heart sounds: No friction rub. No gallop. Pulmonary:      Effort: Pulmonary effort is normal. No respiratory distress. Chest:      Chest wall: No tenderness. Abdominal:      Palpations: Abdomen is soft. Tenderness: There is no abdominal tenderness. There is no guarding. Comments: Abdomen soft, non-tender, non-peritoneal  No guarding on abdominal exam   Musculoskeletal:         General: Tenderness present. Cervical back: Neck supple. No tenderness. Right lower leg: Edema present. Left lower leg: Edema present. Comments: Diffuse tenderness at left leg;  2+ dp/pt pulses in b/l lower extremities  Grossly moving all extremities   Lymphadenopathy:      Cervical: No cervical adenopathy. Skin:     General: Skin is warm. Capillary Refill: Capillary refill takes less than 2 seconds. Neurological:      Mental Status: She is alert and oriented to person, place, and time. Mental status is at baseline.          DIAGNOSTIC RESULTS     Labs Reviewed   COMPREHENSIVE METABOLIC PANEL W/ REFLEX TO MG FOR LOW K - Abnormal; Notable for the following components:       Result Value    BUN 53 (*)     CREATININE 1.8 (*)     Glucose 123 (*)     GFR Non- 27 (*)     GFR  33 (*)     All other components within normal limits   CBC WITH AUTO DIFFERENTIAL - Abnormal; Notable for the following components:    WBC 12.7 (*)     RBC 4.01 (*)     .0 (*)     MCH 33.7 (*)     Segs Relative 87.6 (*)     Lymphocytes % 6.8 (*)     Monocytes % 4.4 (*)     All other components within normal limits        RADIOLOGY:     Non-plain film images such as CT, Ultrasound and MRI are read by the radiologist. Plain radiographic images are visualized and preliminarily interpreted by the emergency physician. Interpretation per the Radiologist below, if available at the time of this note:    XR CHEST PORTABLE   Final Result   1. No acute cardiopulmonary process identified. XR KNEE LEFT (3 VIEWS)   Final Result   1. Acute and displaced fracture of the distal metadiaphysis of the left femur   with fracture lines extending to the femoral component of the knee   arthroplasty. 2. No acute fracture of the pelvis or tibia and fibula identified. 3. Osteopenia. XR PELVIS (1-2 VIEWS)   Final Result   1. Acute and displaced fracture of the distal metadiaphysis of the left femur   with fracture lines extending to the femoral component of the knee   arthroplasty. 2. No acute fracture of the pelvis or tibia and fibula identified. 3. Osteopenia. XR TIBIA FIBULA LEFT (2 VIEWS)   Final Result   1. Acute and displaced fracture of the distal metadiaphysis of the left femur   with fracture lines extending to the femoral component of the knee   arthroplasty. 2. No acute fracture of the pelvis or tibia and fibula identified. 3. Osteopenia. XR FEMUR LEFT (MIN 2 VIEWS)   Final Result   1. Acute and displaced fracture of the distal metadiaphysis of the left femur   with fracture lines extending to the femoral component of the knee   arthroplasty. 2. No acute fracture of the pelvis or tibia and fibula identified. 3. Osteopenia.                ED BEDSIDE ULTRASOUND:   Performed by ED Physician Edmar Sandoval MD       LABS:  Labs Reviewed   COMPREHENSIVE METABOLIC PANEL W/ REFLEX TO MG FOR LOW K - Abnormal; Notable for the following components:       Result Value    BUN 53 (*)     CREATININE 1.8 (*)     Glucose 123 (*)     GFR Non- 27 (*) GFR  33 (*)     All other components within normal limits   CBC WITH AUTO DIFFERENTIAL - Abnormal; Notable for the following components:    WBC 12.7 (*)     RBC 4.01 (*)     .0 (*)     MCH 33.7 (*)     Segs Relative 87.6 (*)     Lymphocytes % 6.8 (*)     Monocytes % 4.4 (*)     All other components within normal limits       All other labs were within normal range or not returned as of this dictation. EMERGENCY DEPARTMENT COURSE and DIFFERENTIAL DIAGNOSIS/MDM:   Vitals:    Vitals:    07/06/21 1909 07/06/21 1910 07/06/21 1915 07/06/21 1926   BP:  (!) 74/58 (!) 82/57 (!) 92/31   Pulse: 67 65 64 66   Resp: 18 20 15 16   Temp:       SpO2: 98% 96% 96% 91%   Weight:       Height:               MDM  Number of Diagnoses or Management Options  Closed fracture of left femur, unspecified fracture morphology, unspecified portion of femur, initial encounter ([facility] [address]  Diagnosis management comments: 67-year-old female presents with left leg injury. She does use a walker at baseline. She has had multiple prior orthopedic fractures including hip replacement on the right and bilateral knee replacements. She reports that she was using her walker with no obstacle in her way when her legs gave out and she fell to the ground. She was unable to get back to a standing position. EMS was called. She denies any head or neck injury. No loss of consciousness. She is not anticoagulated. She presents the emergency department complaining of left leg pain primarily in her distal femur and around her knee. Left lower extremity is neurovascularly intact. X-rays of the left leg were obtained and do show a pair prosthetic distal left femur fracture. She did have her prior knee replacement done by a physician at another hospital system Dr. Edmar Uribe. I did consult out for Dr. Edmar Uribe who felt that patient should be sent to an orthopedic surgeon who will do repair of the traumatic fracture.   Patient does have a preference for several [facility]. I am consulting the hospitalist.  She was given a small bolus of fluids given a mildly low blood pressure. Blood pressure did improve after the fluids. Patient requests transfer to [facility].  She is given additional pain medications in the emergency department. At this time her vitals are stable. Castaneda catheter will be placed perioperatively. She has been accepted male, hospital be transferred there in stable condition. Amount and/or Complexity of Data Reviewed  Clinical lab tests: reviewed  Tests in the radiology section of CPT®: reviewed  Decide to obtain previous medical records or to obtain history from someone other than the patient: yes        -  Patient seen and evaluated in the emergency department. -  Triage and nursing notes reviewed and incorporated. -  Old chart records reviewed and incorporated. -  Work-up included:  See above  -  Results discussed with patient. CONSULTS:  [address] [place] [address] CONSULT TO ORTHOPEDIC SURGERY    PROCEDURES:  None performed unless otherwise noted below     Procedures        FINAL IMPRESSION      1. Closed fracture of left femur, unspecified fracture morphology, unspecified portion of femur, initial encounter ([facility] [address]          DISPOSITION/PLAN   DISPOSITION        PATIENT REFERRED TO:  No follow-up provider specified. DISCHARGE MEDICATIONS:  New Prescriptions    No medications on file       ED Provider Disposition Time  DISPOSITION        Appropriate personal protective equipment was worn during the patient's evaluation. These included surgical, eye protection, surgical mask or in 95 respirator and gloves. The patient was also placed in a surgical mask for the prevention of possible spread of respiratory viral illnesses. The Patient was instructed to read the package inserts with any medication that was prescribed. Major potential reactions and medication interactions were discussed.   The Patient understands that there are numerous possible adverse reactions not covered. The patient was also instructed to arrange follow-up with his or her primary care provider for review of any pending labwork or incidental findings on any radiology results that were obtained. All efforts were made to discuss any incidental findings that require further monitoring. Controlled Substances Monitoring:     No flowsheet data found.     (Please note that portions of this note were completed with a voice recognition program.  Efforts were made to edit the dictations but occasionally words are mis-transcribed.)    Dario Boothe MD (electronically signed)  Attending Emergency Physician           Dario Boothe MD  07/06/21 7545

## 2021-07-06 NOTE — ED NOTES
Pt reports 10/10 pain. Yony Malik notified. Pt given ordered pain medications. Will continue to monitor.       Winston Avina RN  07/06/21 5450

## 2021-07-06 NOTE — ED TRIAGE NOTES
Pt was walking with walker and her leg gave out. Pt denies hitting her head and denies being on any blood thinners. Pt is currently having pain in her right ankle and left knee and thigh. Pt currently alert and oriented x4.

## 2021-07-06 NOTE — ED NOTES
Kris Peres notified for pts blood pressure being low. This nurse started ordered fluids bolus. Will continue to monitor.       Romulo Aguero RN  07/06/21 [id number]

## 2021-07-06 NOTE — ED NOTES
Bed: ED-19  Expected date:   Expected time:   Means of arrival:   Comments:  Reina Amin RN  07/06/21 4729

## 2021-07-07 NOTE — ED NOTES
Castaneda placed per . Pt cleaned up and into clean gown for transport. Vital signs taken and pt aware she is going to [facility]. Pt alert and oriented x4 and transported to [facility] via [facility].       Yas Garcia RN  07/06/21 6612

## 2021-07-07 NOTE — ED NOTES
Pts family called to verify if they had her walker and they confirmed that they did have her walker.       Ermelinda Betancourt RN  07/06/21 5244

## 2021-07-07 NOTE — ED NOTES
Report given to Manpreet Erazo RN at [facility]. All questions answered.       Rakan Askew RN  07/06/21 8410